# Patient Record
Sex: MALE | Race: WHITE | NOT HISPANIC OR LATINO
[De-identification: names, ages, dates, MRNs, and addresses within clinical notes are randomized per-mention and may not be internally consistent; named-entity substitution may affect disease eponyms.]

---

## 2018-06-01 PROBLEM — Z00.00 ENCOUNTER FOR PREVENTIVE HEALTH EXAMINATION: Status: ACTIVE | Noted: 2018-06-01

## 2018-06-25 ENCOUNTER — APPOINTMENT (OUTPATIENT)
Dept: PULMONOLOGY | Facility: CLINIC | Age: 73
End: 2018-06-25
Payer: MEDICARE

## 2018-06-25 VITALS
HEIGHT: 72 IN | SYSTOLIC BLOOD PRESSURE: 150 MMHG | BODY MASS INDEX: 30.88 KG/M2 | HEART RATE: 68 BPM | DIASTOLIC BLOOD PRESSURE: 89 MMHG | WEIGHT: 228 LBS | RESPIRATION RATE: 20 BRPM | OXYGEN SATURATION: 98 %

## 2018-06-25 DIAGNOSIS — G47.9 SLEEP DISORDER, UNSPECIFIED: ICD-10-CM

## 2018-06-25 DIAGNOSIS — Z82.0 FAMILY HISTORY OF EPILEPSY AND OTHER DISEASES OF THE NERVOUS SYSTEM: ICD-10-CM

## 2018-06-25 DIAGNOSIS — Z80.49 FAMILY HISTORY OF MALIGNANT NEOPLASM OF OTHER GENITAL ORGANS: ICD-10-CM

## 2018-06-25 DIAGNOSIS — Z86.39 PERSONAL HISTORY OF OTHER ENDOCRINE, NUTRITIONAL AND METABOLIC DISEASE: ICD-10-CM

## 2018-06-25 DIAGNOSIS — G47.30 SLEEP APNEA, UNSPECIFIED: ICD-10-CM

## 2018-06-25 PROCEDURE — 94727 GAS DIL/WSHOT DETER LNG VOL: CPT

## 2018-06-25 PROCEDURE — 94664 DEMO&/EVAL PT USE INHALER: CPT | Mod: 59

## 2018-06-25 PROCEDURE — 71046 X-RAY EXAM CHEST 2 VIEWS: CPT

## 2018-06-25 PROCEDURE — 94060 EVALUATION OF WHEEZING: CPT

## 2018-06-25 PROCEDURE — 94750: CPT

## 2018-06-25 PROCEDURE — 99205 OFFICE O/P NEW HI 60 MIN: CPT | Mod: 25

## 2018-06-25 PROCEDURE — 94729 DIFFUSING CAPACITY: CPT

## 2018-07-27 ENCOUNTER — APPOINTMENT (OUTPATIENT)
Dept: PULMONOLOGY | Facility: CLINIC | Age: 73
End: 2018-07-27
Payer: MEDICARE

## 2018-07-27 PROCEDURE — 95800 SLP STDY UNATTENDED: CPT

## 2018-07-28 PROCEDURE — 95800 SLP STDY UNATTENDED: CPT

## 2018-07-31 ENCOUNTER — FORM ENCOUNTER (OUTPATIENT)
Age: 73
End: 2018-07-31

## 2018-08-13 ENCOUNTER — APPOINTMENT (OUTPATIENT)
Dept: PULMONOLOGY | Facility: CLINIC | Age: 73
End: 2018-08-13
Payer: MEDICARE

## 2018-08-13 VITALS
DIASTOLIC BLOOD PRESSURE: 83 MMHG | OXYGEN SATURATION: 98 % | HEIGHT: 72 IN | BODY MASS INDEX: 30.48 KG/M2 | TEMPERATURE: 97.9 F | WEIGHT: 225 LBS | HEART RATE: 91 BPM | SYSTOLIC BLOOD PRESSURE: 134 MMHG

## 2018-08-13 PROCEDURE — 99213 OFFICE O/P EST LOW 20 MIN: CPT

## 2018-11-10 ENCOUNTER — TRANSCRIPTION ENCOUNTER (OUTPATIENT)
Age: 73
End: 2018-11-10

## 2018-12-21 ENCOUNTER — APPOINTMENT (OUTPATIENT)
Dept: CARDIOLOGY | Facility: CLINIC | Age: 73
End: 2018-12-21
Payer: MEDICARE

## 2018-12-21 ENCOUNTER — NON-APPOINTMENT (OUTPATIENT)
Age: 73
End: 2018-12-21

## 2018-12-21 VITALS
DIASTOLIC BLOOD PRESSURE: 80 MMHG | BODY MASS INDEX: 30.75 KG/M2 | SYSTOLIC BLOOD PRESSURE: 133 MMHG | HEART RATE: 68 BPM | WEIGHT: 227 LBS | HEIGHT: 72 IN | RESPIRATION RATE: 16 BRPM

## 2018-12-21 DIAGNOSIS — Z86.39 PERSONAL HISTORY OF OTHER ENDOCRINE, NUTRITIONAL AND METABOLIC DISEASE: ICD-10-CM

## 2018-12-21 DIAGNOSIS — Z78.9 OTHER SPECIFIED HEALTH STATUS: ICD-10-CM

## 2018-12-21 PROCEDURE — 93000 ELECTROCARDIOGRAM COMPLETE: CPT

## 2018-12-21 PROCEDURE — 99213 OFFICE O/P EST LOW 20 MIN: CPT

## 2019-03-14 ENCOUNTER — RX RENEWAL (OUTPATIENT)
Age: 74
End: 2019-03-14

## 2019-07-26 ENCOUNTER — APPOINTMENT (OUTPATIENT)
Dept: CARDIOLOGY | Facility: CLINIC | Age: 74
End: 2019-07-26
Payer: MEDICARE

## 2019-07-26 ENCOUNTER — NON-APPOINTMENT (OUTPATIENT)
Age: 74
End: 2019-07-26

## 2019-07-26 VITALS
RESPIRATION RATE: 15 BRPM | SYSTOLIC BLOOD PRESSURE: 127 MMHG | HEIGHT: 72 IN | WEIGHT: 227 LBS | HEART RATE: 68 BPM | DIASTOLIC BLOOD PRESSURE: 80 MMHG | BODY MASS INDEX: 30.75 KG/M2

## 2019-07-26 PROCEDURE — 99213 OFFICE O/P EST LOW 20 MIN: CPT

## 2019-07-26 PROCEDURE — 93000 ELECTROCARDIOGRAM COMPLETE: CPT

## 2019-07-26 NOTE — DISCUSSION/SUMMARY
[FreeTextEntry1] : This is a 74-year-old male with past medical history significant for murmur, hypertension, right bundle branch block, sleep apnea,NIDDM,  who comes in for cardiac evaluation. He denies chest pain, shortness of breath, dizziness or syncope. The patient had blood work done October 16, 2018 which demonstrated total cholesterol 136 mg/dL, HDL 29 mg/dL, LDL cholesterol 72 mg/dL, and hemoglobin A1c of 7.1%.\par Electrocardiogram done  July 26, 2019 demonstrated normal sinus rhythm rate 68 beats per minute is otherwise marked with right bundle branch block.\par Blood work done May 20, 2019 demonstrated a total cholesterol of 111 mg/dL, HDL 29 mg/dL, LDL was still pending.\par His LDL cholesterol should be less than 70 mg/dL.\par He will continue on his current dose of Lipitor 20 mg per day, Avalide 300/12.5 mg per day, in addition to his potassium 10 mEq only Saturday and Sunday and diabetic medications.\par He will schedule echo Doppler examination to evaluate his murmur, left ventricular function, chamber size, and rule out hypertrophy.\par \par The patient is currently hemodynamically stable and asymptomatic from a cardiac standpoint.\par I reviewed the results of his previous blood work, and echo Doppler examination with him today. He is instructed to follow up with you regarding his overall medical care. He will followup with me in 4 months.\par The patient understands that aerobic exercises must be increased to 40 minutes 4 times per week. A detailed discussion of lifestyle modification was done today. The patient has a good understanding of the diagnosis, and treatment plan. Lifestyle modification was also outlined.

## 2019-07-26 NOTE — PHYSICAL EXAM
[General Appearance - Well Developed] : well developed [Normal Appearance] : normal appearance [Well Groomed] : well groomed [General Appearance - Well Nourished] : well nourished [Normal Conjunctiva] : the conjunctiva exhibited no abnormalities [Normal Oral Mucosa] : normal oral mucosa [Normal Jugular Venous V Waves Present] : normal jugular venous V waves present [] : no respiratory distress [Respiration, Rhythm And Depth] : normal respiratory rhythm and effort [Exaggerated Use Of Accessory Muscles For Inspiration] : no accessory muscle use [Auscultation Breath Sounds / Voice Sounds] : lungs were clear to auscultation bilaterally [Abdomen Soft] : soft [Abnormal Walk] : normal gait [Gait - Sufficient For Exercise Testing] : the gait was sufficient for exercise testing [Skin Color & Pigmentation] : normal skin color and pigmentation [Nail Clubbing] : no clubbing of the fingernails [Skin Turgor] : normal skin turgor [Oriented To Time, Place, And Person] : oriented to person, place, and time [Impaired Insight] : insight and judgment were intact [Affect] : the affect was normal [Memory Recent] : recent memory was not impaired [FreeTextEntry1] : Left neck bruit v transmitted murmur

## 2019-07-26 NOTE — REASON FOR VISIT
[Follow-Up - Clinic] : a clinic follow-up of [FreeTextEntry1] : 72 y/o M with PMHx siginificant of hypertension, DM presents today for a follow-up cardiac evaluation. Patient denies shortness of breath, chest pain, dizziness, palpitations, syncope.

## 2019-08-21 ENCOUNTER — APPOINTMENT (OUTPATIENT)
Dept: PULMONOLOGY | Facility: CLINIC | Age: 74
End: 2019-08-21
Payer: MEDICARE

## 2019-08-21 VITALS
SYSTOLIC BLOOD PRESSURE: 130 MMHG | BODY MASS INDEX: 30.48 KG/M2 | OXYGEN SATURATION: 98 % | RESPIRATION RATE: 14 BRPM | WEIGHT: 225 LBS | DIASTOLIC BLOOD PRESSURE: 83 MMHG | HEART RATE: 68 BPM | HEIGHT: 72 IN

## 2019-08-21 LAB — POCT - HEMOGLOBIN (HGB), QUANTITATIVE, TRANSCUTANEOUS: 15.7

## 2019-08-21 PROCEDURE — 88738 HGB QUANT TRANSCUTANEOUS: CPT

## 2019-08-21 PROCEDURE — 94060 EVALUATION OF WHEEZING: CPT

## 2019-08-21 PROCEDURE — 94664 DEMO&/EVAL PT USE INHALER: CPT | Mod: 59

## 2019-08-21 PROCEDURE — 94726 PLETHYSMOGRAPHY LUNG VOLUMES: CPT

## 2019-08-21 PROCEDURE — 94750: CPT

## 2019-08-21 PROCEDURE — 94729 DIFFUSING CAPACITY: CPT

## 2019-08-21 PROCEDURE — 99213 OFFICE O/P EST LOW 20 MIN: CPT | Mod: 25

## 2019-08-21 NOTE — PHYSICAL EXAM
[Normal Appearance] : normal appearance [General Appearance - Well Developed] : well developed [No Deformities] : no deformities [General Appearance - Well Nourished] : well nourished [Well Groomed] : well groomed [General Appearance - In No Acute Distress] : no acute distress [Normal Conjunctiva] : the conjunctiva exhibited no abnormalities [Eyelids - No Xanthelasma] : the eyelids demonstrated no xanthelasmas [Normal Oropharynx] : normal oropharynx [Neck Appearance] : the appearance of the neck was normal [Neck Cervical Mass (___cm)] : no neck mass was observed [Thyroid Diffuse Enlargement] : the thyroid was not enlarged [Thyroid Nodule] : there were no palpable thyroid nodules [Jugular Venous Distention Increased] : there was no jugular-venous distention [Heart Rate And Rhythm] : heart rate and rhythm were normal [Heart Sounds] : normal S1 and S2 [Murmurs] : no murmurs present [Respiration, Rhythm And Depth] : normal respiratory rhythm and effort [Auscultation Breath Sounds / Voice Sounds] : lungs were clear to auscultation bilaterally [Exaggerated Use Of Accessory Muscles For Inspiration] : no accessory muscle use [Abdomen Soft] : soft [Bowel Sounds] : normal bowel sounds [Abdomen Tenderness] : non-tender [] : no hepato-splenomegaly [Abdomen Mass (___ Cm)] : no abdominal mass palpated [Abnormal Walk] : normal gait [Gait - Sufficient For Exercise Testing] : the gait was sufficient for exercise testing [Deep Tendon Reflexes (DTR)] : deep tendon reflexes were 2+ and symmetric [Sensation] : the sensory exam was normal to light touch and pinprick [No Focal Deficits] : no focal deficits [Oriented To Time, Place, And Person] : oriented to person, place, and time [Impaired Insight] : insight and judgment were intact [Affect] : the affect was normal [FreeTextEntry1] : vascular  varicose veins

## 2019-08-21 NOTE — PROCEDURE
[FreeTextEntry1] : PFT Body Box 8/21/2019\par Spirometry normal flow rates\par Lung volumes normal\par Resistance and sp conductance normal\par  DLCO 84 % normal range\par saw tooth pattern\par

## 2019-08-21 NOTE — DISCUSSION/SUMMARY
[FreeTextEntry1] : Mild obstructive sleep apnea\par In the past patient had declined treatment with CPAP oral recommendation regarding oral appliance\par PFT normal pulmonary physiology\par Recommended 1 year follow-up sleep study to determine whether there is any worsening of his AHI\par Patient felt very uncomfortable during the prior study and at present has declined and he states he thinks his sleep pattern snoring is improved\par Advised weight loss avoiding heavy meals alcohol sedatives before bedtime\par 1 year follow-up recommended

## 2019-08-21 NOTE — HISTORY OF PRESENT ILLNESS
[Improved] : have improved [None] : The patient is currently asymptomatic [Obstructive Sleep Apnea] : obstructive sleep apnea [AHI: ___ per hour] : Apnea-hypopnea index:  [unfilled] per hour [Date: ___] : Date of most recent diagnostic polysomnogram: [unfilled] [FreeTextEntry1] : Feels his sleep pattern is improved with less snoring\par No respiratory symptoms noted\par Denies wheeze cough purulent sputum.\par Weight stable appetite normal.\par Up-to-date with cardiology as noted\par No active tobacco use.\par

## 2019-09-16 ENCOUNTER — RX RENEWAL (OUTPATIENT)
Age: 74
End: 2019-09-16

## 2020-01-24 ENCOUNTER — APPOINTMENT (OUTPATIENT)
Dept: CARDIOLOGY | Facility: CLINIC | Age: 75
End: 2020-01-24
Payer: MEDICARE

## 2020-01-24 PROCEDURE — 93880 EXTRACRANIAL BILAT STUDY: CPT

## 2020-01-24 PROCEDURE — 93306 TTE W/DOPPLER COMPLETE: CPT

## 2020-02-24 ENCOUNTER — RX RENEWAL (OUTPATIENT)
Age: 75
End: 2020-02-24

## 2020-03-09 ENCOUNTER — RX RENEWAL (OUTPATIENT)
Age: 75
End: 2020-03-09

## 2020-08-19 ENCOUNTER — APPOINTMENT (OUTPATIENT)
Dept: CARDIOLOGY | Facility: CLINIC | Age: 75
End: 2020-08-19
Payer: MEDICARE

## 2020-08-19 ENCOUNTER — NON-APPOINTMENT (OUTPATIENT)
Age: 75
End: 2020-08-19

## 2020-08-19 VITALS
RESPIRATION RATE: 15 BRPM | HEIGHT: 72 IN | WEIGHT: 222 LBS | HEART RATE: 68 BPM | SYSTOLIC BLOOD PRESSURE: 134 MMHG | DIASTOLIC BLOOD PRESSURE: 82 MMHG | BODY MASS INDEX: 30.07 KG/M2

## 2020-08-19 PROCEDURE — 93000 ELECTROCARDIOGRAM COMPLETE: CPT

## 2020-08-19 PROCEDURE — 99214 OFFICE O/P EST MOD 30 MIN: CPT

## 2020-11-18 ENCOUNTER — APPOINTMENT (OUTPATIENT)
Dept: NEUROLOGY | Facility: CLINIC | Age: 75
End: 2020-11-18
Payer: MEDICARE

## 2020-11-18 VITALS
DIASTOLIC BLOOD PRESSURE: 89 MMHG | WEIGHT: 227 LBS | SYSTOLIC BLOOD PRESSURE: 168 MMHG | HEART RATE: 71 BPM | HEIGHT: 72 IN | BODY MASS INDEX: 30.75 KG/M2

## 2020-11-18 VITALS — TEMPERATURE: 97.2 F

## 2020-11-18 PROCEDURE — 99203 OFFICE O/P NEW LOW 30 MIN: CPT

## 2020-11-18 RX ORDER — ASPIRIN 81 MG
81 TABLET, DELAYED RELEASE (ENTERIC COATED) ORAL DAILY
Refills: 0 | Status: ACTIVE | COMMUNITY

## 2020-11-18 NOTE — ASSESSMENT
[FreeTextEntry1] : Mr. Means is a 75-year-old who suffers from hypertension, diabetes and hyperlipidemia.  He presents with the recent onset of postural and action tremors in both arms.  He has no other neurologic symptoms or findings.  In the absence of hyperthyroidism, I suspect this represents an essential tremor.  There is no evidence of other motor symptoms to suggest Parkinson's disease.\par \par He declined MR imaging of the brain.  He will retrieve his blood tests for my review.  I suggested that he return to the office in 6 months for routine follow-up.  If symptoms progress, he will contact me for reevaluation.\par \par Thank you for the courtesy of this consultation.

## 2020-11-18 NOTE — HISTORY OF PRESENT ILLNESS
[FreeTextEntry1] : Mr. Ronald Means is a 75-year-old left-handed gentleman who was referred for neurologic evaluation at your kind suggestion.\par \par Mr. Means suffers from hypertension, hyperlipidemia and diabetes.  He is very diligent about his health care.  He is under the care of Dr. Esa Contreras, a cardiologist.  He is scheduled for an echocardiogram and stress test soon.  A carotid Doppler performed last year revealed no significant stenosis.\par \par Several months ago, he noticed the onset of a mild tremor in his hands.  This is prominent in the morning and affects him when he eats.  He denies headaches, visual, cognitive, swallowing, sensory or gait difficulties.  There is no family history of tremor including his identical twin brother.  His mother  at an early age due to ovarian cancer.  Thyroid functions were checked and apparently were normal.

## 2020-11-18 NOTE — CONSULT LETTER
[Dear  ___] : Dear  [unfilled], [Consult Letter:] : I had the pleasure of evaluating your patient, [unfilled]. [Please see my note below.] : Please see my note below. [Consult Closing:] : Thank you very much for allowing me to participate in the care of this patient.  If you have any questions, please do not hesitate to contact me. [Sincerely,] : Sincerely, [FreeTextEntry3] : Colin Smiley MD\par  [DrMarianela  ___] : Dr. VILLALOBOS

## 2020-11-18 NOTE — PHYSICAL EXAM
[FreeTextEntry1] : Constitutional:  Patient was well-developed, well-nourished and in no acute distress. \par \par Head:  Normocephalic, atraumatic. Tympanic membranes were clear. \par \par Neck:  Supple with full range of motion. \par \par Cardiovascular:  Cardiac rhythm was regular without murmur. There were no carotid bruits. Peripheral pulses were full and symmetric. \par \par Respiratory:  Lungs were clear. \par \par Abdomen:  Soft and nontender. \par \par Spine:  Nontender. \par \par Skin:  There were no rashes. \par \par NEUROLOGICAL EXAMINATION:\par \par Mental Status: She was alert and oriented. Speech was fluent. There was no dysarthria or vocal tremor. \par \par Cranial Nerves: \par \par II: Visual acuity was 20-2 in the right eye and 20-1 in the left eye with glasses. Pupils were equal and reactive. Visual fields were full. Funduscopic examination was normal. \par \par III, IV, VI:  Eye movements were full without nystagmus. \par \par V: Facial sensation was intact. \par \par VII: Facial strength was normal. \par \par VIII: Hearing was equal. \par \par IX, X: Palatal movement was normal. Phonation was normal. \par \par XI: Sternocleidomastoids and trapezii were normal. \par \par XII: Tongue was midline and movements normal. There was no lingual atrophy or fasciculations. \par \par Motor Examination: Muscle bulk, tone and strength were normal.  There was a mild postural and endpoint tremor in both hands.  There was no rest tremor.\par \par Sensory Examination: Pinprick, vibration and joint position sense were intact. \par \par Reflexes: DTRs were 2 throughout except for the brachioradialis and ankles which were absent. \par \par Plantar Responses: Plantar responses were flexor. \par \par Coordination/Cerebellar Function: There was no dysmetria on finger to nose or heel to shin testing. \par \par Gait/Stance: Gait was normal. Romberg was negative.  Tandem was unsteady.\par

## 2021-01-11 ENCOUNTER — APPOINTMENT (OUTPATIENT)
Dept: CARDIOLOGY | Facility: CLINIC | Age: 76
End: 2021-01-11
Payer: MEDICARE

## 2021-01-11 VITALS
BODY MASS INDEX: 29.53 KG/M2 | DIASTOLIC BLOOD PRESSURE: 83 MMHG | HEART RATE: 76 BPM | HEIGHT: 72 IN | WEIGHT: 218 LBS | TEMPERATURE: 97.9 F | RESPIRATION RATE: 16 BRPM | SYSTOLIC BLOOD PRESSURE: 124 MMHG

## 2021-01-11 DIAGNOSIS — Z87.891 PERSONAL HISTORY OF NICOTINE DEPENDENCE: ICD-10-CM

## 2021-01-11 PROCEDURE — 99213 OFFICE O/P EST LOW 20 MIN: CPT

## 2021-05-13 ENCOUNTER — TRANSCRIPTION ENCOUNTER (OUTPATIENT)
Age: 76
End: 2021-05-13

## 2021-05-18 ENCOUNTER — APPOINTMENT (OUTPATIENT)
Dept: NEUROLOGY | Facility: CLINIC | Age: 76
End: 2021-05-18
Payer: MEDICARE

## 2021-05-18 VITALS
DIASTOLIC BLOOD PRESSURE: 62 MMHG | WEIGHT: 210 LBS | SYSTOLIC BLOOD PRESSURE: 95 MMHG | HEIGHT: 72 IN | HEART RATE: 87 BPM | BODY MASS INDEX: 28.44 KG/M2

## 2021-05-18 DIAGNOSIS — G25.0 ESSENTIAL TREMOR: ICD-10-CM

## 2021-05-18 PROCEDURE — 99213 OFFICE O/P EST LOW 20 MIN: CPT

## 2021-05-18 NOTE — PHYSICAL EXAM
[FreeTextEntry1] : Constitutional:  Patient was well-developed, well-nourished and in no acute distress. \par \par Head:  Normocephalic, atraumatic. Tympanic membranes were clear. \par \par Neck:  Supple with full range of motion. \par \par Cardiovascular:  Cardiac rhythm was regular without murmur. There were no carotid bruits. Peripheral pulses were full and symmetric. \par \par Respiratory:  Lungs were clear. \par \par Abdomen:  Soft and nontender. \par \par Spine:  Nontender. \par \par Skin:  There were no rashes. \par \par NEUROLOGICAL EXAMINATION:\par \par Mental Status: She was alert and oriented. Speech was fluent. There was no dysarthria or vocal tremor. \par \par Cranial Nerves: \par \par II: He could finger count bilaterally. Pupils were equal and reactive. Visual fields were full. Funduscopic examination was normal. \par \par III, IV, VI:  Eye movements were full without nystagmus. \par \par V: Facial sensation was intact. \par \par VII: Facial strength was normal. \par \par VIII: Hearing was equal. \par \par IX, X: Palatal movement was normal. Phonation was normal. \par \par XI: Sternocleidomastoids and trapezii were normal. \par \par XII: Tongue was midline and movements normal. There was no lingual atrophy or fasciculations. \par \par Motor Examination: Muscle bulk, tone and strength were normal.  There were mild postural and endpoint tremors in both hands.  There was no rest tremor or rigidity.\par \par Sensory Examination: Pinprick, vibration and joint position sense were intact. \par \par Reflexes: DTRs were 2 throughout except for the brachioradialis and ankles which were absent. \par \par Plantar Responses: Plantar responses were flexor. \par \par Coordination/Cerebellar Function: There was no dysmetria on finger to nose or heel to shin testing. \par \par Gait/Stance: Gait was normal. Romberg was negative.  Tandem was unsteady.\par

## 2021-05-18 NOTE — HISTORY OF PRESENT ILLNESS
[FreeTextEntry1] : Mr. Ronald Means returned to the office having been initially evaluated on November 18, 2020.  He is a 76-year-old left-handed gentleman who suffers from hypertension, diabetes and hyperlipidemia.  He presented with recent onset of postural tremor in his arms.  There were no other neurologic symptoms or findings.  TSH was 1.59 on October 13, 2020.  I suspected that this represented an essential tremor.  There was no evidence of other motor symptoms to suggest Parkinson's disease.  He declined imaging of the brain.  He consented to return in 6 months for routine follow-up.\par \par Mr. Means is recovering from a recent diarrheal illness.  Other family members were involved.  He received his last Moderna COVID-19 vaccine in February.\par \par He has mild hand tremors which are not functionally limiting.  He denies headaches, neck or back pain, cognitive, speech, visual, hearing, swallowing, sensory or gait difficulties.

## 2021-05-18 NOTE — ASSESSMENT
[FreeTextEntry1] : Mr. Means is a 76-year-old who suffers from hypertension, diabetes and hyperlipidemia.  He suffers from an essential tremor.  There has been no progression of symptoms since last seen.  He declines imaging.  He will consent to a follow-up visit in 6 months.  If his symptoms progress, he will call me for prompt reevaluation.

## 2021-11-05 ENCOUNTER — LABORATORY RESULT (OUTPATIENT)
Age: 76
End: 2021-11-05

## 2021-11-05 ENCOUNTER — NON-APPOINTMENT (OUTPATIENT)
Age: 76
End: 2021-11-05

## 2021-11-05 ENCOUNTER — APPOINTMENT (OUTPATIENT)
Dept: CARDIOLOGY | Facility: CLINIC | Age: 76
End: 2021-11-05
Payer: MEDICARE

## 2021-11-05 VITALS
DIASTOLIC BLOOD PRESSURE: 70 MMHG | RESPIRATION RATE: 15 BRPM | OXYGEN SATURATION: 98 % | TEMPERATURE: 97.7 F | SYSTOLIC BLOOD PRESSURE: 126 MMHG | WEIGHT: 202 LBS | HEIGHT: 72 IN | HEART RATE: 68 BPM | BODY MASS INDEX: 27.36 KG/M2

## 2021-11-05 VITALS
HEIGHT: 72 IN | TEMPERATURE: 98 F | HEART RATE: 64 BPM | OXYGEN SATURATION: 98 % | WEIGHT: 202 LBS | BODY MASS INDEX: 27.36 KG/M2 | RESPIRATION RATE: 16 BRPM

## 2021-11-05 DIAGNOSIS — I73.9 PERIPHERAL VASCULAR DISEASE, UNSPECIFIED: ICD-10-CM

## 2021-11-05 DIAGNOSIS — G47.33 OBSTRUCTIVE SLEEP APNEA (ADULT) (PEDIATRIC): ICD-10-CM

## 2021-11-05 PROCEDURE — 99214 OFFICE O/P EST MOD 30 MIN: CPT

## 2021-11-05 PROCEDURE — 93000 ELECTROCARDIOGRAM COMPLETE: CPT

## 2021-11-05 PROCEDURE — 93922 UPR/L XTREMITY ART 2 LEVELS: CPT

## 2021-11-05 NOTE — REASON FOR VISIT
[Follow-Up - Clinic] : a clinic follow-up of [Mitral Regurgitation] : mitral regurgitation [CV Risk Factors and Non-Cardiac Disease] : CV risk factors and non-cardiac disease [Arrhythmia/ECG Abnorrmalities] : arrhythmia/ECG abnormalities [Hyperlipidemia] : hyperlipidemia [Hypertension] : hypertension [FreeTextEntry1] : NIDDM, sleep apnea

## 2021-11-05 NOTE — DISCUSSION/SUMMARY
[FreeTextEntry1] : This is a 76-year-old male with past medical history significant for murmur, hypertension, right bundle branch block, sleep apnea,NIDDM,  who comes in for cardiac evaluation. He denies chest pain, shortness of breath, dizziness or syncope. \par The patient has lost 20 pounds over the last 6 to 8 months.  He is walking on a regular basis.\par Ankle-brachial index done November 5, 2021 was nondiagnostic on the right secondary to noncompressible on the right with normal arterial flow on the left.\par He will have blood work done today for electrolytes and lipid panel.\par He is currently hemodynamically stable and asymptomatic from a cardiac standpoint.\par He will follow-up with his primary care physician as well as his endocrinologist.\par Electrocardiogram done November 5, 2021 demonstrate normal sinus rhythm rate 68 bpm is otherwise remarkable for right bundle branch block.\par \par The patient remained stable from a cardiac standpoint.  He did have blood work done October 13 with his endocrinologist.  His hemoglobin A1c had improved to 6.3.\par His lipid panel demonstrated an LDL calculated of 67 mg/dL, triglycerides 144 mg/dL, cholesterol 124 mg/dL, HDL 28 mg/dL.\par Echo Doppler examination from January 24, 2020, which demonstrated minimal to mild mitral valve regurgitation, minimal to mild tricuspid valve regurgitation, enlarged proximal aortic root of 4.3 cm, and aortic valve sclerosis.\par \par Electrocardiogram done August 19, 2020 demonstrated normal sinus rhythm rate 68 bpm is otherwise remarkable for right bundle branch block.\par \par The patient had blood work done by his primary care physician May 19, 2020 which demonstrated hemoglobin A1c of 6.2, cholesterol 120, triglycerides 151, HDL of 27, calculated LDL cholesterol 63 mg/dL.\par He will continue on his current dose of Lipitor 20 mg/day, Avalide 300/12.5 mg/day, potassium 10 mEq only on Saturday and Sunday, aspirin 81 mg/day in addition to his diabetic medications.\par The patient is currently hemodynamically stable and asymptomatic from cardiac standpoint.  He will continue on his regular diet and exercise program.\par \par He had a normal thallium stress test February 29, 2016 with normal left ventricular function and estimated ejection fraction 65%.\par \par The patient understands that aerobic exercises must be increased to 40 minutes 4 times per week. A detailed discussion of lifestyle modification was done today. The patient has a good understanding of the diagnosis, and treatment plan. Lifestyle modification was also outlined.

## 2021-11-05 NOTE — PHYSICAL EXAM
[General Appearance - Well Developed] : well developed [Normal Appearance] : normal appearance [Well Groomed] : well groomed [General Appearance - Well Nourished] : well nourished [Normal Oral Mucosa] : normal oral mucosa [Normal Jugular Venous V Waves Present] : normal jugular venous V waves present [] : no respiratory distress [Respiration, Rhythm And Depth] : normal respiratory rhythm and effort [Exaggerated Use Of Accessory Muscles For Inspiration] : no accessory muscle use [Auscultation Breath Sounds / Voice Sounds] : lungs were clear to auscultation bilaterally [Abdomen Soft] : soft [Abnormal Walk] : normal gait [Gait - Sufficient For Exercise Testing] : the gait was sufficient for exercise testing [Nail Clubbing] : no clubbing of the fingernails [Skin Color & Pigmentation] : normal skin color and pigmentation [Skin Turgor] : normal skin turgor [Oriented To Time, Place, And Person] : oriented to person, place, and time [Impaired Insight] : insight and judgment were intact [Affect] : the affect was normal [Memory Recent] : recent memory was not impaired [No Precordial Heave] : no precordial heave was noted [No Abnormalities] : the abdominal aorta was not enlarged and no bruit was heard [No Pitting Edema] : no pitting edema present [Well Developed] : well developed [Well Nourished] : well nourished [No Acute Distress] : no acute distress [Normal Conjunctiva] : normal conjunctiva [Normal Venous Pressure] : normal venous pressure [No Carotid Bruit] : no carotid bruit [Normal S1, S2] : normal S1, S2 [No Rub] : no rub [No Gallop] : no gallop [5th Left ICS - MCL] : palpated at the 5th LICS in the midclavicular line [Normal] : normal [Normal Rate] : normal [Rhythm Regular] : regular [Normal S1] : normal S1 [Normal S2] : normal S2 [I] : a grade 1 [2+] : left 2+ [Clear Lung Fields] : clear lung fields [Good Air Entry] : good air entry [No Respiratory Distress] : no respiratory distress  [Soft] : abdomen soft [Non Tender] : non-tender [No Masses/organomegaly] : no masses/organomegaly [Normal Bowel Sounds] : normal bowel sounds [Normal Gait] : normal gait [No Edema] : no edema [No Cyanosis] : no cyanosis [No Clubbing] : no clubbing [No Varicosities] : no varicosities [No Rash] : no rash [No Skin Lesions] : no skin lesions [Moves all extremities] : moves all extremities [No Focal Deficits] : no focal deficits [Normal Speech] : normal speech [Alert and Oriented] : alert and oriented [Normal memory] : normal memory [FreeTextEntry1] : Left neck bruit v transmitted murmur [S3] : no S3 [S4] : no S4 [Right Carotid Bruit] : no bruit heard over the right carotid [Left Carotid Bruit] : no bruit heard over the left carotid [Right Femoral Bruit] : no bruit heard over the right femoral artery [Left Femoral Bruit] : no bruit heard over the left femoral artery

## 2021-11-08 PROBLEM — I73.9 CLAUDICATION: Status: ACTIVE | Noted: 2021-11-05

## 2021-12-09 ENCOUNTER — TRANSCRIPTION ENCOUNTER (OUTPATIENT)
Age: 76
End: 2021-12-09

## 2022-01-03 ENCOUNTER — EMERGENCY (EMERGENCY)
Facility: HOSPITAL | Age: 77
LOS: 1 days | Discharge: HOME | End: 2022-01-03
Attending: EMERGENCY MEDICINE
Payer: MEDICARE

## 2022-01-03 VITALS
TEMPERATURE: 98 F | HEART RATE: 71 BPM | RESPIRATION RATE: 20 BRPM | DIASTOLIC BLOOD PRESSURE: 92 MMHG | WEIGHT: 205.03 LBS | SYSTOLIC BLOOD PRESSURE: 155 MMHG | OXYGEN SATURATION: 99 %

## 2022-01-03 VITALS
HEART RATE: 80 BPM | DIASTOLIC BLOOD PRESSURE: 89 MMHG | OXYGEN SATURATION: 98 % | RESPIRATION RATE: 20 BRPM | SYSTOLIC BLOOD PRESSURE: 170 MMHG | TEMPERATURE: 97 F

## 2022-01-03 LAB
APPEARANCE UR: CLEAR — SIGNIFICANT CHANGE UP
BACTERIA # UR AUTO: 0 — SIGNIFICANT CHANGE UP
BILIRUB UR-MCNC: NEGATIVE — SIGNIFICANT CHANGE UP
COLOR SPEC: SIGNIFICANT CHANGE UP
DIFF PNL FLD: NEGATIVE — SIGNIFICANT CHANGE UP
EPI CELLS # UR: 0 /HPF — SIGNIFICANT CHANGE UP
GLUCOSE UR QL: NEGATIVE — SIGNIFICANT CHANGE UP
KETONES UR-MCNC: NEGATIVE — SIGNIFICANT CHANGE UP
LEUKOCYTE ESTERASE UR-ACNC: NEGATIVE — SIGNIFICANT CHANGE UP
NITRITE UR-MCNC: NEGATIVE — SIGNIFICANT CHANGE UP
PH UR: 5.5 — SIGNIFICANT CHANGE UP (ref 5–8)
PROT UR-MCNC: NEGATIVE — SIGNIFICANT CHANGE UP
RBC CASTS # UR COMP ASSIST: 1 /HPF — SIGNIFICANT CHANGE UP (ref 0–4)
SP GR SPEC: 1.01 — SIGNIFICANT CHANGE UP (ref 1.01–1.02)
UROBILINOGEN FLD QL: NEGATIVE — SIGNIFICANT CHANGE UP
WBC UR QL: 1 /HPF — SIGNIFICANT CHANGE UP (ref 0–5)

## 2022-01-03 PROCEDURE — 81001 URINALYSIS AUTO W/SCOPE: CPT

## 2022-01-03 PROCEDURE — 87086 URINE CULTURE/COLONY COUNT: CPT

## 2022-01-03 PROCEDURE — 99283 EMERGENCY DEPT VISIT LOW MDM: CPT | Mod: GC

## 2022-01-03 PROCEDURE — 99283 EMERGENCY DEPT VISIT LOW MDM: CPT

## 2022-01-03 NOTE — ED PROVIDER NOTE - ATTENDING CONTRIBUTION TO CARE
76yr M hx of DM HTN HL w fam hx of hernias p/w abd and rt groin fullness and pain while jogging, and a sensation of groin mass. since presenting to the ED, mass has disappeared and pain resolved. reports no change to bowel habits, no urinary sx, no hematuria or blood in stool, no n/v, no fever chills, and no cp sob.  exam notable for well appearing, sitting comfortably, neuro intact, clear lungs, S1-2, soft non tender abd, rt inguinal hernia w 4cm mouth, reduced. well perfused  diff include recurrent inguinal hernia vs urinary infec vs less likely nephrolith. planf or UA. educated the patient regarding return precautions, plans for surgery outpt for eventual repair. is happy with plan

## 2022-01-03 NOTE — ED PROVIDER NOTE - PATIENT PORTAL LINK FT
You can access the FollowMyHealth Patient Portal offered by [facility] by registering at the following website: [email or web address]. By joining [facility]’s FollowMyHealth portal, you will also be able to view your health information using other applications (apps) compatible with our system.

## 2022-01-03 NOTE — ED PROVIDER NOTE - NSFOLLOWUPCLINICS_GEN_ALL_ED_FT
[facility]  General Surgery  [address] [place] [address] - [address] [place] [address]  Phone: [phone]  Fax:

## 2022-01-03 NOTE — ED PROVIDER NOTE - OBJECTIVE STATEMENT
76M with pmhx of DM, HTN, HLD presenting we CC of R groin pain. Patient first noticed the pain on thursday while straining during a covid test. Then noticed it again during the day today, just prior to presentation after a 2 mile walk. Noticed a bulge in that area. Pain and bulge was not resolving on its own today leading to ER presentation. Denies n/v, diarrhea, urinary symptoms. No history of inguinal hernias in self. No prior abdominal surgeries.

## 2022-01-03 NOTE — ED PROVIDER NOTE - NS ED ROS FT
CONST: no fevers, no chills, no trauma  EYES: no pain, no blurry vision   ENT: no sore throat, no epistaxis, no rhinorrhea  CV: no chest pain, no palpitations, no orthopnea, no extremity pain or swelling  RESP: no shortness of breath, no cough, no sputum, no pleurisy, no wheezing  ABD: +abdominal pain, no nausea, no vomiting, no diarrhea, no black or bloody stool  : no dysuria, no hematuria, no frequency, no urgency  MSK: no back pain, no neck pain, no extremity pain  NEURO: no headache, no sensory disturbances, no focal weakness, no dizziness  HEME: no easy bleeding or bruising  SKIN: no diaphoresis, no rash

## 2022-01-03 NOTE — ED PROVIDER NOTE - NSFOLLOWUPINSTRUCTIONS_ED_ALL_ED_FT
Please see attached information on hernias.     Return to the ER for new or worsening pain, fevers, vomiting, or any other concerning symptoms.     Follow up with our general surgery clinic and your PCP.     You can take 650mg of Tylenol for pain as needed up to 4 times a day.

## 2022-01-03 NOTE — ED ADULT NURSE NOTE - OBJECTIVE STATEMENT
Patient felt discomfort in groin when walking this morning. Patient denies pain and burning on urination. Patient believes this discomfort to be a inguinal hernia. PMHx includes diabetes. UA and UC collected and given to the lab. Awaiting results. patient is comfortable and states the pain has subsided. Patient felt discomfort in groin when walking this morning. Patient denies pain and burning on urination. Patient believes this discomfort to be a inguinal hernia. PMHx includes diabetes which is controlled by oral and subQ meds, also controlled by diet and exercise. UA and UC collected and given to the lab. Awaiting results. Patient is comfortable and states the pain has subsided. 75 y/o male presents to ED. Patient felt discomfort in groin when walking this morning. Patient denies pain and burning on urination. Patient believes this discomfort to be a inguinal hernia. PMHx includes diabetes which is controlled by oral and subQ meds, also controlled by diet and exercise. UA and UC collected and given to the lab. Awaiting results. Patient is comfortable and states the pain has subsided.

## 2022-01-03 NOTE — ED PROVIDER NOTE - CLINICAL SUMMARY MEDICAL DECISION MAKING FREE TEXT BOX
76M presenting w R groin pain. On exam VSS, +reducible R inguinal hernia. Given exam and patient w resolving pain, will likely be okay with outpatient surgery follow up. Will check urine, reassess. Herlinda Barcenas, ORESTES PGY3

## 2022-01-03 NOTE — ED ADULT NURSE NOTE - NS ED NURSE LEVEL OF CONSCIOUSNESS ORIENTATION
Price (Do Not Change): 0.00 Detail Level: Simple Instructions: This plan will send the code FBSD to the PM system.  DO NOT or CHANGE the price. Oriented - self; Oriented - place; Oriented - time

## 2022-01-03 NOTE — ED PROVIDER NOTE - PHYSICAL EXAMINATION
Const: Well-nourished, Well-developed, appearing stated age.  Eyes: no conjunctival injection, and symmetrical lids.  HEENT: Head NCAT, no lesions. Atraumatic external nose and ears.   Neck: Symmetric, trachea midline.   CVS: +S1/S2, Radial and DP pulses 2+ b/l.   RESP: Unlabored respiratory effort. Clear to auscultation bilaterally.  GI: +Inguinal bulge in R groin when coughing, Rest of abd is soft Nontender/Nondistended, No CVA tenderness b/l.   MSK: Normocephalic/Atraumatic, Lower Extremities w/o edema b/l.   Skin: Warm, dry and intact.   Neuro: Fluent Speech. Motor & Sensation grossly intact.  Psych: Awake, Alert, & Oriented (AAO) x3. Appropriate mood and affect.

## 2022-01-04 LAB
CULTURE RESULTS: SIGNIFICANT CHANGE UP
SPECIMEN SOURCE: SIGNIFICANT CHANGE UP

## 2022-01-06 ENCOUNTER — APPOINTMENT (OUTPATIENT)
Dept: SURGERY | Facility: CLINIC | Age: 77
End: 2022-01-06
Payer: MEDICARE

## 2022-01-06 VITALS
SYSTOLIC BLOOD PRESSURE: 144 MMHG | HEART RATE: 78 BPM | HEIGHT: 72 IN | DIASTOLIC BLOOD PRESSURE: 86 MMHG | BODY MASS INDEX: 27.77 KG/M2 | WEIGHT: 205 LBS | OXYGEN SATURATION: 99 %

## 2022-01-06 VITALS — TEMPERATURE: 96.9 F

## 2022-01-06 DIAGNOSIS — K40.90 UNILATERAL INGUINAL HERNIA, W/OUT OBSTRUCTION OR GANGRENE, NOT SPECIFIED AS RECURRENT: ICD-10-CM

## 2022-01-06 PROCEDURE — 99203 OFFICE O/P NEW LOW 30 MIN: CPT

## 2022-01-06 RX ORDER — MULTIVITAMIN
TABLET ORAL
Refills: 0 | Status: ACTIVE | COMMUNITY

## 2022-01-06 RX ORDER — SITAGLIPTIN AND METFORMIN HYDROCHLORIDE 50; 1000 MG/1; MG/1
50-1000 TABLET, FILM COATED ORAL TWICE DAILY
Refills: 0 | Status: ACTIVE | COMMUNITY

## 2022-01-06 RX ORDER — CHROMIUM 200 MCG
1000 TABLET ORAL
Refills: 0 | Status: ACTIVE | COMMUNITY

## 2022-01-06 NOTE — HISTORY OF PRESENT ILLNESS
[de-identified] : Mr. APURVA CURRIE is  a 76 year old male who was referred by Dr. Georgi Nugent with the chief complaint of having pain and swelling in his Right  groin.  He has had the condition for for few days  and is worse when he is walking or standing.  He is stating that on Saturday he had pain and felt a bulge in his right groin and he went to [facility] ED. he states that by the time he was examined the bulge went away.   He denies any fever or  night sweats. Appetite is good and weight is stable. \par  \par

## 2022-01-06 NOTE — CONSULT LETTER
[Dear  ___] : Dear  [unfilled], [Consult Letter:] : I had the pleasure of evaluating your patient, [unfilled]. [Please see my note below.] : Please see my note below. [Consult Closing:] : Thank you very much for allowing me to participate in the care of this patient.  If you have any questions, please do not hesitate to contact me. [Sincerely,] : Sincerely, [FreeTextEntry3] : Nehemias Brar MD, FACS

## 2022-01-06 NOTE — PHYSICAL EXAM
[Alert] : alert [Oriented to Person] : oriented to person [Oriented to Place] : oriented to place [Oriented to Time] : oriented to time [Calm] : calm [de-identified] : He  is alert, well-groomed, and in NAD\par   [de-identified] : anicteric.  Nasal mucosa pink, septum midline. Oral mucosa pink.  Tongue midline, Pharynx without exudates.\par   [de-identified] : Neck supple. Trachea midline. Thyroid isthmus barely palpable, lobes not felt.\par   [de-identified] :  reducible Right  inguinal hernia.  No evidence of hernia on the opposite side.  No penile discharge or lesions.  No scrotal swelling or discoloration. Testes descended bilaterally, smooth, without masses. Epididymis non-tender.

## 2022-01-06 NOTE — PLAN
[FreeTextEntry1] : Mr. CURRIE  was told significance of findings, options, risks and benefits were explained.  Informed consent for right inguinal hernia repair and potential risks, benefits and alternatives (surgical options were discussed including non-surgical options or the option of no surgery) to the planned surgery were discussed in depth.  All surgical options were discussed including non-surgical treatments.  He wishes to proceed with surgery.  We will plan for surgery on at the next available date, pending any required insurance pre-certification or pre-approval. He agrees to obtain any necessary pre-operative evaluations and testing prior to surgery.\par Patient advised to seek immediate medical attention with any acute change in symptoms or with the development of any new or worsening symptoms.  Patient's questions and concerns addressed to patient's satisfaction, and patient verbalized an understanding of the information discussed.\par \par

## 2022-01-12 ENCOUNTER — OUTPATIENT (OUTPATIENT)
Dept: OUTPATIENT SERVICES | Facility: HOSPITAL | Age: 77
LOS: 1 days | End: 2022-01-12

## 2022-01-12 VITALS
OXYGEN SATURATION: 98 % | HEIGHT: 70.5 IN | HEART RATE: 73 BPM | DIASTOLIC BLOOD PRESSURE: 81 MMHG | RESPIRATION RATE: 16 BRPM | WEIGHT: 199.96 LBS | TEMPERATURE: 97 F | SYSTOLIC BLOOD PRESSURE: 141 MMHG

## 2022-01-12 DIAGNOSIS — K40.90 UNILATERAL INGUINAL HERNIA, WITHOUT OBSTRUCTION OR GANGRENE, NOT SPECIFIED AS RECURRENT: ICD-10-CM

## 2022-01-12 DIAGNOSIS — K40.30 UNILATERAL INGUINAL HERNIA, WITH OBSTRUCTION, WITHOUT GANGRENE, NOT SPECIFIED AS RECURRENT: ICD-10-CM

## 2022-01-12 DIAGNOSIS — I10 ESSENTIAL (PRIMARY) HYPERTENSION: ICD-10-CM

## 2022-01-12 DIAGNOSIS — Z90.89 ACQUIRED ABSENCE OF OTHER ORGANS: Chronic | ICD-10-CM

## 2022-01-12 DIAGNOSIS — Z98.890 OTHER SPECIFIED POSTPROCEDURAL STATES: Chronic | ICD-10-CM

## 2022-01-12 DIAGNOSIS — E11.9 TYPE 2 DIABETES MELLITUS WITHOUT COMPLICATIONS: ICD-10-CM

## 2022-01-12 PROBLEM — E78.5 HYPERLIPIDEMIA, UNSPECIFIED: Chronic | Status: ACTIVE | Noted: 2022-01-03

## 2022-01-12 LAB
A1C WITH ESTIMATED AVERAGE GLUCOSE RESULT: 6.7 % — HIGH (ref 4–5.6)
ANION GAP SERPL CALC-SCNC: 16 MMOL/L — HIGH (ref 7–14)
BUN SERPL-MCNC: 26 MG/DL — HIGH (ref 7–23)
CALCIUM SERPL-MCNC: 9.7 MG/DL — SIGNIFICANT CHANGE UP (ref 8.4–10.5)
CHLORIDE SERPL-SCNC: 104 MMOL/L — SIGNIFICANT CHANGE UP (ref 98–107)
CO2 SERPL-SCNC: 21 MMOL/L — LOW (ref 22–31)
CREAT SERPL-MCNC: 0.96 MG/DL — SIGNIFICANT CHANGE UP (ref 0.5–1.3)
ESTIMATED AVERAGE GLUCOSE: 146 — SIGNIFICANT CHANGE UP
GLUCOSE SERPL-MCNC: 127 MG/DL — HIGH (ref 70–99)
HCT VFR BLD CALC: 46 % — SIGNIFICANT CHANGE UP (ref 39–50)
HGB BLD-MCNC: 14.6 G/DL — SIGNIFICANT CHANGE UP (ref 13–17)
MCHC RBC-ENTMCNC: 28.1 PG — SIGNIFICANT CHANGE UP (ref 27–34)
MCHC RBC-ENTMCNC: 31.7 GM/DL — LOW (ref 32–36)
MCV RBC AUTO: 88.5 FL — SIGNIFICANT CHANGE UP (ref 80–100)
NRBC # BLD: 0 /100 WBCS — SIGNIFICANT CHANGE UP
NRBC # FLD: 0 K/UL — SIGNIFICANT CHANGE UP
PLATELET # BLD AUTO: 223 K/UL — SIGNIFICANT CHANGE UP (ref 150–400)
POTASSIUM SERPL-MCNC: 4.1 MMOL/L — SIGNIFICANT CHANGE UP (ref 3.5–5.3)
POTASSIUM SERPL-SCNC: 4.1 MMOL/L — SIGNIFICANT CHANGE UP (ref 3.5–5.3)
RBC # BLD: 5.2 M/UL — SIGNIFICANT CHANGE UP (ref 4.2–5.8)
RBC # FLD: 13.6 % — SIGNIFICANT CHANGE UP (ref 10.3–14.5)
SODIUM SERPL-SCNC: 141 MMOL/L — SIGNIFICANT CHANGE UP (ref 135–145)
WBC # BLD: 9.3 K/UL — SIGNIFICANT CHANGE UP (ref 3.8–10.5)
WBC # FLD AUTO: 9.3 K/UL — SIGNIFICANT CHANGE UP (ref 3.8–10.5)

## 2022-01-12 RX ORDER — INSULIN DEGLUDEC 100 U/ML
20 INJECTION, SOLUTION SUBCUTANEOUS
Qty: 0 | Refills: 0 | DISCHARGE

## 2022-01-12 NOTE — H&P PST ADULT - HISTORY OF PRESENT ILLNESS
77 y/o male with Type 2 DM, HLD, HTN and RBBB presents to PST preop for right inguinal hernia repair. pt presented to the ER reporting a bulge and right groin pain. pt subsequently diagnosed with right inguinal hernia. he denies pain at present and says bulge has reduced.

## 2022-01-12 NOTE — H&P PST ADULT - NSICDXPASTMEDICALHX_GEN_ALL_CORE_FT
PAST MEDICAL HISTORY:  DM (diabetes mellitus) type 2 DM    HLD (hyperlipidemia)     HTN (hypertension)     Right bundle branch block     Unilateral inguinal hernia without obstruction or gangrene

## 2022-01-12 NOTE — H&P PST ADULT - PROBLEM SELECTOR PLAN 2
pt will hold Janumet AM of surgery   he will reduce Tresiba by 20% night before surgery. pt will take 16 units    accu check to be assessed on admission

## 2022-01-12 NOTE — H&P PST ADULT - PROBLEM SELECTOR PLAN 1
preop for right inguinal hernia repair on 1/20/22  preop instructions given, pt verbalized understanding  GI prophylaxis and chlorhexidine wash provided  pt is scheduled for COVID testing preop  medical clearance requested- pt with multiple comorbidities and advanced age

## 2022-01-19 ENCOUNTER — TRANSCRIPTION ENCOUNTER (OUTPATIENT)
Age: 77
End: 2022-01-19

## 2022-01-19 VITALS
OXYGEN SATURATION: 98 % | HEART RATE: 82 BPM | SYSTOLIC BLOOD PRESSURE: 166 MMHG | WEIGHT: 199.96 LBS | TEMPERATURE: 97 F | HEIGHT: 70.5 IN | DIASTOLIC BLOOD PRESSURE: 76 MMHG | RESPIRATION RATE: 15 BRPM

## 2022-01-19 NOTE — ASU PREOPERATIVE ASSESSMENT, ADULT (IPARK ONLY) - FALL HARM RISK - UNIVERSAL INTERVENTIONS
Bed in lowest position, wheels locked, appropriate side rails in place/Call bell, personal items and telephone in reach/Instruct patient to call for assistance before getting out of bed or chair/Non-slip footwear when patient is out of bed/[place] to call system/Physically safe environment - no spills, clutter or unnecessary equipment/Purposeful Proactive Rounding/Room/bathroom lighting operational, light cord in reach

## 2022-01-19 NOTE — ASU PREOPERATIVE ASSESSMENT, ADULT (IPARK ONLY) - ACCUCHECK
136 Dorsal Nasal Flap Text: The defect edges were debeveled with a #15 scalpel blade.  Given the location of the defect and the proximity to free margins a dorsal nasal flap was deemed most appropriate.  Using a sterile surgical marker, an appropriate dorsal nasal flap was drawn around the defect.    The area thus outlined was incised deep to adipose tissue with a #15 scalpel blade.  The skin margins were undermined to an appropriate distance in all directions utilizing iris scissors.

## 2022-01-20 ENCOUNTER — OUTPATIENT (OUTPATIENT)
Dept: OUTPATIENT SERVICES | Facility: HOSPITAL | Age: 77
LOS: 1 days | Discharge: HOME | End: 2022-01-20

## 2022-01-20 VITALS
DIASTOLIC BLOOD PRESSURE: 90 MMHG | RESPIRATION RATE: 16 BRPM | SYSTOLIC BLOOD PRESSURE: 125 MMHG | TEMPERATURE: 97 F | OXYGEN SATURATION: 99 % | HEART RATE: 72 BPM

## 2022-01-20 DIAGNOSIS — K40.30 UNILATERAL INGUINAL HERNIA, WITH OBSTRUCTION, WITHOUT GANGRENE, NOT SPECIFIED AS RECURRENT: ICD-10-CM

## 2022-01-20 DIAGNOSIS — Z98.890 OTHER SPECIFIED POSTPROCEDURAL STATES: Chronic | ICD-10-CM

## 2022-01-20 DIAGNOSIS — Z90.89 ACQUIRED ABSENCE OF OTHER ORGANS: Chronic | ICD-10-CM

## 2022-01-20 LAB — GLUCOSE BLDC GLUCOMTR-MCNC: 136 MG/DL — HIGH (ref 70–99)

## 2022-01-20 DEVICE — MESH HERNIA PERFIX PLUG EXTRA LARGE 1.6 X 2": Type: IMPLANTABLE DEVICE | Site: RIGHT | Status: FUNCTIONAL

## 2022-01-20 DEVICE — MESH HERNIA PERFIX PLUG SMALL 1 X 1.35": Type: IMPLANTABLE DEVICE | Site: RIGHT | Status: FUNCTIONAL

## 2022-01-20 RX ORDER — POTASSIUM CHLORIDE 20 MEQ
1 PACKET (EA) ORAL
Qty: 0 | Refills: 0 | DISCHARGE

## 2022-01-20 RX ORDER — ASPIRIN/CALCIUM CARB/MAGNESIUM 324 MG
1 TABLET ORAL
Qty: 0 | Refills: 0 | DISCHARGE

## 2022-01-20 RX ORDER — INSULIN DEGLUDEC 100 U/ML
20 INJECTION, SOLUTION SUBCUTANEOUS
Qty: 0 | Refills: 0 | DISCHARGE

## 2022-01-20 RX ORDER — IRBESARTAN AND HYDROCHLOROTHIAZIDE 12.5; 3 MG/1; MG/1
1 TABLET ORAL
Qty: 0 | Refills: 0 | DISCHARGE

## 2022-01-20 RX ORDER — OXYCODONE HYDROCHLORIDE 5 MG/1
1 TABLET ORAL
Qty: 15 | Refills: 0
Start: 2022-01-20

## 2022-01-20 RX ORDER — SITAGLIPTIN AND METFORMIN HYDROCHLORIDE 500; 50 MG/1; MG/1
1 TABLET, FILM COATED ORAL
Qty: 0 | Refills: 0 | DISCHARGE

## 2022-01-20 RX ORDER — ERGOCALCIFEROL 1.25 MG/1
1 CAPSULE ORAL
Qty: 0 | Refills: 0 | DISCHARGE

## 2022-01-20 RX ORDER — ATORVASTATIN CALCIUM 80 MG/1
1 TABLET, FILM COATED ORAL
Qty: 0 | Refills: 0 | DISCHARGE

## 2022-01-20 RX ORDER — EZETIMIBE 10 MG/1
1 TABLET ORAL
Qty: 0 | Refills: 0 | DISCHARGE

## 2022-01-20 NOTE — ASU DISCHARGE PLAN (ADULT/PEDIATRIC) - ASU DC SPECIAL INSTRUCTIONSFT
REST! Apply ice packs to incision sites 20 mins on, 20 mins off every 4 hours for first 24 hours.     You may take plain Tylenol, ibuprofen (Advil, Motrin), or Aleve if you wish alternating with oxycodone prescription       If having trouble having a bowel movement:   - Metamucil or Konsyl (fiber supplement, available over the counter), 1 tablespoon in 8 oz. of water or juice twice a day  - Colace (stool softener, available over the counter), 100mg three times a day    Keep white steri strips on until follow up at office. Sometimes they fall off on own and thats okay. Okay to shower in 24 hours.     Please call [facility] office ([phone]) to schedule a follow-up appointment to be seen in 10-14 days.

## 2022-01-20 NOTE — ASU DISCHARGE PLAN (ADULT/PEDIATRIC) - NS MD DC FALL RISK RISK
For information on Fall & Injury Prevention, visit: [email or web address] OR  [email or web address] OR  https://www.cdc.gov/steadi/patient.html

## 2022-01-20 NOTE — ASU DISCHARGE PLAN (ADULT/PEDIATRIC) - NURSING INSTRUCTIONS
You received IV Tylenol for pain management at 12p. Please DO NOT take any Tylenol (Acetaminophen) containing products, such as Vicodin, Percocet, Excedrin, and cold medications for the next 6 hours (until 6PM). DO NOT TAKE MORE THAN 3000 MG OF TYLENOL in a 24 hour period    You received IV Toradol for pain management at 1230p. Please DO NOT take Motrin/Ibuprofen/Advil/Aleve/NSAIDs (Non-Steroidal Anti-Inflammatory Drugs) for the next 6 hours (until _630PM).

## 2022-01-20 NOTE — BRIEF OPERATIVE NOTE - NSICDXBRIEFPROCEDURE_GEN_ALL_CORE_FT
PROCEDURES:  Open repair of inguinal hernia using mesh in adult 20-Jan-2022 12:52:02  Gerri Sanchez

## 2022-01-20 NOTE — ASU DISCHARGE PLAN (ADULT/PEDIATRIC) - CARE PROVIDER_API CALL
Ike Rene)  ColonRectal Surgery; Surgery  [address], [address]  [place] [address]  Phone: [phone]  Fax: [phone]  Follow Up Time:

## 2022-03-09 NOTE — ED ADULT NURSE NOTE - LOCATION
Total Volume Injected (Ccs- Only Use Numbers And Decimals): 2 Consent: The risks of atrophy were reviewed with the patient. Concentration Of Solution Injected (Mg/Ml): 20.0 Medical Necessity Clause: This procedure was medically necessary because the lesions that were treated were: Administered By (Optional): Harlan Sullivan MD X Size Of Lesion In Cm (Optional): 0 Validate Note Data When Using Inventory: Yes Detail Level: Simple Kenalog Preparation: Kenalog Include Z78.9 (Other Specified Conditions Influencing Health Status) As An Associated Diagnosis?: No terra

## 2022-04-01 ENCOUNTER — APPOINTMENT (OUTPATIENT)
Dept: CARDIOLOGY | Facility: CLINIC | Age: 77
End: 2022-04-01
Payer: MEDICARE

## 2022-04-01 ENCOUNTER — NON-APPOINTMENT (OUTPATIENT)
Age: 77
End: 2022-04-01

## 2022-04-01 VITALS
WEIGHT: 204 LBS | RESPIRATION RATE: 16 BRPM | SYSTOLIC BLOOD PRESSURE: 126 MMHG | BODY MASS INDEX: 27.63 KG/M2 | HEIGHT: 72 IN | HEART RATE: 63 BPM | OXYGEN SATURATION: 99 % | TEMPERATURE: 98 F | DIASTOLIC BLOOD PRESSURE: 79 MMHG

## 2022-04-01 PROBLEM — E11.9 TYPE 2 DIABETES MELLITUS WITHOUT COMPLICATIONS: Chronic | Status: ACTIVE | Noted: 2022-01-03

## 2022-04-01 PROBLEM — K40.90 UNILATERAL INGUINAL HERNIA, WITHOUT OBSTRUCTION OR GANGRENE, NOT SPECIFIED AS RECURRENT: Chronic | Status: ACTIVE | Noted: 2022-01-12

## 2022-04-01 PROBLEM — I45.10 UNSPECIFIED RIGHT BUNDLE-BRANCH BLOCK: Chronic | Status: ACTIVE | Noted: 2022-01-12

## 2022-04-01 PROCEDURE — 99214 OFFICE O/P EST MOD 30 MIN: CPT

## 2022-04-01 NOTE — PHYSICAL EXAM
[Well Developed] : well developed [Well Nourished] : well nourished [No Acute Distress] : no acute distress [Normal Venous Pressure] : normal venous pressure [No Carotid Bruit] : no carotid bruit [Normal S1, S2] : normal S1, S2 [No Rub] : no rub [No Gallop] : no gallop [Clear Lung Fields] : clear lung fields [Good Air Entry] : good air entry [No Respiratory Distress] : no respiratory distress  [Soft] : abdomen soft [Non Tender] : non-tender [No Masses/organomegaly] : no masses/organomegaly [Normal Bowel Sounds] : normal bowel sounds [Normal Gait] : normal gait [No Edema] : no edema [No Cyanosis] : no cyanosis [No Clubbing] : no clubbing [No Varicosities] : no varicosities [No Rash] : no rash [No Skin Lesions] : no skin lesions [Moves all extremities] : moves all extremities [No Focal Deficits] : no focal deficits [Normal Speech] : normal speech [Alert and Oriented] : alert and oriented [Normal memory] : normal memory [General Appearance - Well Developed] : well developed [Normal Appearance] : normal appearance [Well Groomed] : well groomed [General Appearance - Well Nourished] : well nourished [Normal Conjunctiva] : the conjunctiva exhibited no abnormalities [Normal Oral Mucosa] : normal oral mucosa [Normal Jugular Venous V Waves Present] : normal jugular venous V waves present [FreeTextEntry1] : Left neck bruit v transmitted murmur [] : no respiratory distress [Respiration, Rhythm And Depth] : normal respiratory rhythm and effort [Exaggerated Use Of Accessory Muscles For Inspiration] : no accessory muscle use [Auscultation Breath Sounds / Voice Sounds] : lungs were clear to auscultation bilaterally [Abdomen Soft] : soft [Abnormal Walk] : normal gait [Gait - Sufficient For Exercise Testing] : the gait was sufficient for exercise testing [Nail Clubbing] : no clubbing of the fingernails [Skin Color & Pigmentation] : normal skin color and pigmentation [Skin Turgor] : normal skin turgor [Oriented To Time, Place, And Person] : oriented to person, place, and time [Impaired Insight] : insight and judgment were intact [Affect] : the affect was normal [Memory Recent] : recent memory was not impaired [5th Left ICS - MCL] : palpated at the 5th LICS in the midclavicular line [Normal] : normal [No Precordial Heave] : no precordial heave was noted [Normal Rate] : normal [Rhythm Regular] : regular [Normal S1] : normal S1 [Normal S2] : normal S2 [S3] : no S3 [S4] : no S4 [I] : a grade 1 [Right Carotid Bruit] : no bruit heard over the right carotid [Left Carotid Bruit] : no bruit heard over the left carotid [Right Femoral Bruit] : no bruit heard over the right femoral artery [Left Femoral Bruit] : no bruit heard over the left femoral artery [2+] : left 2+ [No Abnormalities] : the abdominal aorta was not enlarged and no bruit was heard [No Pitting Edema] : no pitting edema present

## 2022-04-01 NOTE — DISCUSSION/SUMMARY
[FreeTextEntry1] : This is a 77-year-old male with past medical history significant for murmur, hypertension, right bundle branch block, sleep apnea,NIDDM,  who comes in for cardiac evaluation. He denies chest pain, shortness of breath, dizziness or syncope.\par The patient is exercising on a daily basis.  He ran a few miles earlier today.\par He will have blood work done with his primary care physician this upcoming week.\par He is interested in his aortic root because a close friend  from an aortic dissection.  I recommended he speak to his primary care physician regarding this issue.  In the meantime he can have a chest x-ray.\par \par The patient has lost 20 pounds over the last 6 to 8 months.  He is walking on a regular basis.\par Ankle-brachial index done 2021 was nondiagnostic on the right secondary to noncompressible on the right with normal arterial flow on the left.\par \par Electrocardiogram done 2021 demonstrate normal sinus rhythm rate 68 bpm is otherwise remarkable for right bundle branch block.\par \par Echo Doppler examination from 2020, which demonstrated minimal to mild mitral valve regurgitation, minimal to mild tricuspid valve regurgitation, enlarged proximal aortic root of 4.3 cm, and aortic valve sclerosis.\par \par Electrocardiogram done 2020 demonstrated normal sinus rhythm rate 68 bpm is otherwise remarkable for right bundle branch block.\par \par Blood work May 19, 2020 which demonstrated hemoglobin A1c of 6.2, cholesterol 120, triglycerides 151, HDL of 27, calculated LDL cholesterol 63 mg/dL.\par He will continue on his current dose of Lipitor 20 mg/day, Avalide 300/12.5 mg/day, potassium 10 mEq only on Saturday and , aspirin 81 mg/day in addition to his diabetic medications.\par The patient is currently hemodynamically stable and asymptomatic from cardiac standpoint.  He will continue on his regular diet and exercise program.\par \par He had a normal thallium stress test 2016 with normal left ventricular function and estimated ejection fraction 65%.\par \par The patient understands that aerobic exercises must be increased to 40 minutes 4 times per week. A detailed discussion of lifestyle modification was done today. The patient has a good understanding of the diagnosis, and treatment plan. Lifestyle modification was also outlined.

## 2022-04-01 NOTE — REASON FOR VISIT
[CV Risk Factors and Non-Cardiac Disease] : CV risk factors and non-cardiac disease [Arrhythmia/ECG Abnorrmalities] : arrhythmia/ECG abnormalities [Follow-Up - Clinic] : a clinic follow-up of [Hyperlipidemia] : hyperlipidemia [Hypertension] : hypertension [Mitral Regurgitation] : mitral regurgitation [FreeTextEntry1] : NIDDM, sleep apnea

## 2022-04-04 ENCOUNTER — OUTPATIENT (OUTPATIENT)
Dept: OUTPATIENT SERVICES | Facility: HOSPITAL | Age: 77
LOS: 1 days | End: 2022-04-04
Payer: MEDICARE

## 2022-04-04 ENCOUNTER — RESULT REVIEW (OUTPATIENT)
Age: 77
End: 2022-04-04

## 2022-04-04 ENCOUNTER — APPOINTMENT (OUTPATIENT)
Dept: RADIOLOGY | Facility: CLINIC | Age: 77
End: 2022-04-04
Payer: MEDICARE

## 2022-04-04 DIAGNOSIS — Z00.8 ENCOUNTER FOR OTHER GENERAL EXAMINATION: ICD-10-CM

## 2022-04-04 DIAGNOSIS — Z90.89 ACQUIRED ABSENCE OF OTHER ORGANS: Chronic | ICD-10-CM

## 2022-04-04 DIAGNOSIS — Z98.890 OTHER SPECIFIED POSTPROCEDURAL STATES: Chronic | ICD-10-CM

## 2022-04-04 PROCEDURE — 71046 X-RAY EXAM CHEST 2 VIEWS: CPT | Mod: 26

## 2022-04-04 PROCEDURE — 71046 X-RAY EXAM CHEST 2 VIEWS: CPT

## 2022-04-04 RX ORDER — INSULIN GLARGINE 300 U/ML
300 INJECTION, SOLUTION SUBCUTANEOUS
Refills: 0 | Status: DISCONTINUED | COMMUNITY
End: 2022-04-04

## 2022-04-04 RX ORDER — GLIMEPIRIDE 2 MG/1
2 TABLET ORAL TWICE DAILY
Refills: 0 | Status: DISCONTINUED | COMMUNITY
End: 2022-04-04

## 2022-10-17 ENCOUNTER — APPOINTMENT (OUTPATIENT)
Dept: CARDIOLOGY | Facility: CLINIC | Age: 77
End: 2022-10-17

## 2022-11-21 ENCOUNTER — APPOINTMENT (OUTPATIENT)
Dept: CARDIOLOGY | Facility: CLINIC | Age: 77
End: 2022-11-21

## 2023-01-18 ENCOUNTER — NON-APPOINTMENT (OUTPATIENT)
Age: 78
End: 2023-01-18

## 2023-01-18 ENCOUNTER — APPOINTMENT (OUTPATIENT)
Dept: CARDIOLOGY | Facility: CLINIC | Age: 78
End: 2023-01-18
Payer: MEDICARE

## 2023-01-18 VITALS
WEIGHT: 200 LBS | BODY MASS INDEX: 27.09 KG/M2 | OXYGEN SATURATION: 99 % | DIASTOLIC BLOOD PRESSURE: 78 MMHG | TEMPERATURE: 98.4 F | HEIGHT: 72 IN | SYSTOLIC BLOOD PRESSURE: 118 MMHG | HEART RATE: 62 BPM | RESPIRATION RATE: 16 BRPM

## 2023-01-18 VITALS — HEART RATE: 62 BPM

## 2023-01-18 PROCEDURE — 93000 ELECTROCARDIOGRAM COMPLETE: CPT

## 2023-01-18 PROCEDURE — 99214 OFFICE O/P EST MOD 30 MIN: CPT

## 2023-01-18 NOTE — DISCUSSION/SUMMARY
[FreeTextEntry1] : This is a 77-year-old male with past medical history significant for murmur, hypertension, right bundle branch block, sleep apnea,NIDDM,  who comes in for cardiac evaluation. He denies chest pain, shortness of breath, dizziness or syncope.\par Electrocardiogram done 2023 demonstrated normal sinus rhythm rate of 62 bpm is otherwise remarkable for right bundle branch block.\par Patient will have an echo Doppler examination scheduled to evaluate his aortic root, left ventricular function, chamber size, and rule out hypertrophy.\par He will follow-up with his primary care physician and endocrinology.\par The patient is exercising on a daily basis.  He ran a few miles earlier today.\par He will have blood work done with his primary care physician this upcoming week.\par He is interested in his aortic root because a close friend  from an aortic dissection.  I recommended he speak to his primary care physician regarding this issue.  In the meantime he can have a chest x-ray.\par \par The patient has lost 20 pounds over the last 6 to 8 months.  He is walking on a regular basis.\par Ankle-brachial index done 2021 was nondiagnostic on the right secondary to noncompressible on the right with normal arterial flow on the left.\par \par Electrocardiogram done 2021 demonstrate normal sinus rhythm rate 68 bpm is otherwise remarkable for right bundle branch block.\par \par Echo Doppler examination from 2020, which demonstrated minimal to mild mitral valve regurgitation, minimal to mild tricuspid valve regurgitation, enlarged proximal aortic root of 4.3 cm, and aortic valve sclerosis.\par \par Electrocardiogram done 2020 demonstrated normal sinus rhythm rate 68 bpm is otherwise remarkable for right bundle branch block.\par \par Blood work May 19, 2020 which demonstrated hemoglobin A1c of 6.2, cholesterol 120, triglycerides 151, HDL of 27, calculated LDL cholesterol 63 mg/dL.\par He will continue on his current dose of Lipitor 20 mg/day, Avalide 300/12.5 mg/day, potassium 10 mEq only on Saturday and , aspirin 81 mg/day in addition to his diabetic medications.\par The patient is currently hemodynamically stable and asymptomatic from cardiac standpoint.  He will continue on his regular diet and exercise program.\par \par He had a normal thallium stress test 2016 with normal left ventricular function and estimated ejection fraction 65%.\par \par The patient understands that aerobic exercises must be increased to 40 minutes 4 times per week. A detailed discussion of lifestyle modification was done today. The patient has a good understanding of the diagnosis, and treatment plan. Lifestyle modification was also outlined.

## 2023-01-18 NOTE — PHYSICAL EXAM
[Well Developed] : well developed [Well Nourished] : well nourished [No Acute Distress] : no acute distress [Normal Venous Pressure] : normal venous pressure [No Carotid Bruit] : no carotid bruit [Normal S1, S2] : normal S1, S2 [No Rub] : no rub [No Gallop] : no gallop [Clear Lung Fields] : clear lung fields [Good Air Entry] : good air entry [No Respiratory Distress] : no respiratory distress  [Soft] : abdomen soft [Non Tender] : non-tender [No Masses/organomegaly] : no masses/organomegaly [Normal Bowel Sounds] : normal bowel sounds [Normal Gait] : normal gait [No Edema] : no edema [No Cyanosis] : no cyanosis [No Clubbing] : no clubbing [No Varicosities] : no varicosities [No Rash] : no rash [No Skin Lesions] : no skin lesions [Moves all extremities] : moves all extremities [No Focal Deficits] : no focal deficits [Normal Speech] : normal speech [Alert and Oriented] : alert and oriented [Normal memory] : normal memory [General Appearance - Well Developed] : well developed [Normal Appearance] : normal appearance [Well Groomed] : well groomed [General Appearance - Well Nourished] : well nourished [Normal Conjunctiva] : the conjunctiva exhibited no abnormalities [Normal Oral Mucosa] : normal oral mucosa [Normal Jugular Venous V Waves Present] : normal jugular venous V waves present [] : no respiratory distress [Respiration, Rhythm And Depth] : normal respiratory rhythm and effort [Exaggerated Use Of Accessory Muscles For Inspiration] : no accessory muscle use [Auscultation Breath Sounds / Voice Sounds] : lungs were clear to auscultation bilaterally [Abdomen Soft] : soft [Abnormal Walk] : normal gait [Gait - Sufficient For Exercise Testing] : the gait was sufficient for exercise testing [Nail Clubbing] : no clubbing of the fingernails [Skin Color & Pigmentation] : normal skin color and pigmentation [Skin Turgor] : normal skin turgor [Oriented To Time, Place, And Person] : oriented to person, place, and time [Impaired Insight] : insight and judgment were intact [Affect] : the affect was normal [Memory Recent] : recent memory was not impaired [5th Left ICS - MCL] : palpated at the 5th LICS in the midclavicular line [Normal] : normal [No Precordial Heave] : no precordial heave was noted [Normal Rate] : normal [Rhythm Regular] : regular [Normal S1] : normal S1 [Normal S2] : normal S2 [I] : a grade 1 [2+] : left 2+ [No Abnormalities] : the abdominal aorta was not enlarged and no bruit was heard [No Pitting Edema] : no pitting edema present [FreeTextEntry1] : Left neck bruit v transmitted murmur [S3] : no S3 [S4] : no S4 [Right Carotid Bruit] : no bruit heard over the right carotid [Left Carotid Bruit] : no bruit heard over the left carotid [Right Femoral Bruit] : no bruit heard over the right femoral artery [Left Femoral Bruit] : no bruit heard over the left femoral artery

## 2023-05-23 ENCOUNTER — APPOINTMENT (OUTPATIENT)
Dept: CARDIOLOGY | Facility: CLINIC | Age: 78
End: 2023-05-23
Payer: MEDICARE

## 2023-05-23 VITALS
HEART RATE: 97 BPM | RESPIRATION RATE: 16 BRPM | DIASTOLIC BLOOD PRESSURE: 80 MMHG | OXYGEN SATURATION: 97 % | WEIGHT: 203 LBS | TEMPERATURE: 97.1 F | HEIGHT: 72 IN | SYSTOLIC BLOOD PRESSURE: 125 MMHG | BODY MASS INDEX: 27.5 KG/M2

## 2023-05-23 DIAGNOSIS — I65.29 OCCLUSION AND STENOSIS OF UNSPECIFIED CAROTID ARTERY: ICD-10-CM

## 2023-05-23 DIAGNOSIS — I35.9 NONRHEUMATIC AORTIC VALVE DISORDER, UNSPECIFIED: ICD-10-CM

## 2023-05-23 DIAGNOSIS — R06.09 OTHER FORMS OF DYSPNEA: ICD-10-CM

## 2023-05-23 PROCEDURE — 99213 OFFICE O/P EST LOW 20 MIN: CPT | Mod: 25

## 2023-05-23 PROCEDURE — 93015 CV STRESS TEST SUPVJ I&R: CPT

## 2023-05-23 PROCEDURE — 93306 TTE W/DOPPLER COMPLETE: CPT

## 2023-05-23 PROCEDURE — 93880 EXTRACRANIAL BILAT STUDY: CPT

## 2023-05-23 NOTE — DISCUSSION/SUMMARY
[FreeTextEntry1] : This is a 78-year-old male with past medical history significant for murmur, hypertension, right bundle branch block, sleep apnea,NIDDM,  who comes in for cardiac evaluation. He denies chest pain, shortness of breath, dizziness or syncope.\par The patient had a normal exercise stress test May 23, 2023.\par He had recent blood work done with his endocrinologist, and he will get a copy for me to review.\par Lipid panel done 2022 demonstrated a cholesterol of 99 mg/dL, HDL 30 mg/dL, LDL 47 mg/dL.  He will remain on his usual medication, atorvastatin 40 mg/day and Zetia 10 mg/day.\par He is encouraged to continue his daily walking routine.\par He will follow-up with you regarding his overall medical care\par Electrocardiogram done 2023 demonstrated normal sinus rhythm rate of 62 bpm is otherwise remarkable for right bundle branch block.\par Patient will have an echo Doppler examination scheduled to evaluate his aortic root, left ventricular function, chamber size, and rule out hypertrophy.\par He will follow-up with his primary care physician and endocrinology.\par The patient is exercising on a daily basis.  He ran a few miles earlier today.\par He will have blood work done with his primary care physician this upcoming week.\par He is interested in his aortic root because a close friend  from an aortic dissection.  I recommended he speak to his primary care physician regarding this issue.  In the meantime he can have a chest x-ray.\par \par The patient has lost 20 pounds over the last 6 to 8 months.  He is walking on a regular basis.\par Ankle-brachial index done 2021 was nondiagnostic on the right secondary to noncompressible on the right with normal arterial flow on the left.\par \par Electrocardiogram done 2021 demonstrate normal sinus rhythm rate 68 bpm is otherwise remarkable for right bundle branch block.\par \par Echo Doppler examination from 2020, which demonstrated minimal to mild mitral valve regurgitation, minimal to mild tricuspid valve regurgitation, enlarged proximal aortic root of 4.3 cm, and aortic valve sclerosis.\par \par Electrocardiogram done 2020 demonstrated normal sinus rhythm rate 68 bpm is otherwise remarkable for right bundle branch block.\par \par Blood work May 19, 2020 which demonstrated hemoglobin A1c of 6.2, cholesterol 120, triglycerides 151, HDL of 27, calculated LDL cholesterol 63 mg/dL.\par He will continue on his current dose of Lipitor 20 mg/day, Avalide 300/12.5 mg/day, potassium 10 mEq only on Saturday and , aspirin 81 mg/day in addition to his diabetic medications.\par The patient is currently hemodynamically stable and asymptomatic from cardiac standpoint.  He will continue on his regular diet and exercise program.\par \par He had a normal thallium stress test 2016 with normal left ventricular function and estimated ejection fraction 65%.\par \par The patient understands that aerobic exercises must be increased to 40 minutes 4 times per week. A detailed discussion of lifestyle modification was done today. The patient has a good understanding of the diagnosis, and treatment plan. Lifestyle modification was also outlined.

## 2023-05-24 RX ORDER — FLUOROURACIL 50 MG/G
5 CREAM TOPICAL
Qty: 40 | Refills: 0 | Status: COMPLETED | COMMUNITY
Start: 2021-01-27 | End: 2023-05-24

## 2023-07-04 PROBLEM — R06.09 DYSPNEA ON EXERTION: Status: ACTIVE | Noted: 2023-05-23

## 2023-07-04 PROBLEM — I65.29 CAROTID ARTERY PLAQUE: Status: ACTIVE | Noted: 2019-07-26

## 2023-07-18 RX ORDER — EZETIMIBE 10 MG/1
10 TABLET ORAL
Qty: 90 | Refills: 1 | Status: ACTIVE | COMMUNITY
Start: 2021-12-01

## 2023-07-18 RX ORDER — ATORVASTATIN CALCIUM 40 MG/1
40 TABLET, FILM COATED ORAL
Qty: 90 | Refills: 1 | Status: ACTIVE | COMMUNITY

## 2023-07-18 RX ORDER — IRBESARTAN AND HYDROCHLOROTHIAZIDE 300; 12.5 MG/1; MG/1
300-12.5 TABLET ORAL
Qty: 90 | Refills: 1 | Status: ACTIVE | COMMUNITY

## 2023-09-21 NOTE — H&P PST ADULT - [facility]
Physical Therapy Visit    Visit Type: Daily Treatment Note  Visit: 2  Referring Provider: Lavon Douglas MD  Medical Diagnosis (from order): Diagnosis Information    Diagnosis  G57.92 (ICD-10-CM) - Neuropathy of left lower extremity  M54.50, M79.605 (ICD-10-CM) - Lumbar pain with radiation down left leg         SUBJECTIVE                                                                                                               Pt reports that he does not feel like his back is significantly better. Pt continues to have B hand numbness at night time which limits his sleep.       OBJECTIVE                                                                                                                     Range of Motion (ROM)   (degrees unless noted; active unless noted; norms in ( ); negative=lacking to 0, positive=beyond 0)  Cervical:    - Flexion (45-50): 35°    - Extension (45-60): 35°    - Rotation (60-80):        • Left: 70°        • Right: 70°                       Treatment     Therapeutic Exercise  Sideglide (no better)  L sidelying R rotation (decrease, better) 4min x 3* with towel  Chin tuck extension (decrease, better) 10 x 2*    Skilled input: verbal instruction/cues    Writer verbally educated and received verbal consent for hand placement, positioning of patient, and techniques to be performed today from patient   Home Exercise Program  *above indicates provided as part of home exercise program      ASSESSMENT                                                                                                            Pt's lumbar flexion deviation towards R side improved with flexion rotation exercise which seems to indicate that he has lateral derangement. Pt's cervical extension ROM improved significantly with repeated retraction extension this date.   Education:   - Results of above outlined education: Verbalizes understanding    PLAN                                                                                                                            Suggestions for next session as indicated: Progress per plan of care       Therapy procedure time and total treatment time can be found documented on the Time Entry flowsheet     3

## 2023-09-26 ENCOUNTER — APPOINTMENT (OUTPATIENT)
Dept: CARDIOLOGY | Facility: CLINIC | Age: 78
End: 2023-09-26

## 2024-01-24 ENCOUNTER — LABORATORY RESULT (OUTPATIENT)
Age: 79
End: 2024-01-24

## 2024-02-02 ENCOUNTER — RX RENEWAL (OUTPATIENT)
Age: 79
End: 2024-02-02

## 2024-02-23 LAB
ALBUMIN SERPL ELPH-MCNC: 4.5 G/DL
ALP BLD-CCNC: 74 U/L
ALT SERPL-CCNC: 27 U/L
AST SERPL-CCNC: 25 U/L
BILIRUB DIRECT SERPL-MCNC: 0.3 MG/DL
BILIRUB INDIRECT SERPL-MCNC: 1.6 MG/DL
BILIRUB SERPL-MCNC: 1.9 MG/DL
LDLC SERPL DIRECT ASSAY-MCNC: 57 MG/DL
PROT SERPL-MCNC: 7.1 G/DL

## 2024-03-11 ENCOUNTER — TRANSCRIPTION ENCOUNTER (OUTPATIENT)
Age: 79
End: 2024-03-11

## 2024-03-11 ENCOUNTER — NON-APPOINTMENT (OUTPATIENT)
Age: 79
End: 2024-03-11

## 2024-03-13 ENCOUNTER — APPOINTMENT (OUTPATIENT)
Dept: ORTHOPEDIC SURGERY | Facility: CLINIC | Age: 79
End: 2024-03-13
Payer: MEDICARE

## 2024-03-13 VITALS — BODY MASS INDEX: 27.22 KG/M2 | HEIGHT: 72 IN | WEIGHT: 201 LBS

## 2024-03-13 DIAGNOSIS — M65.342 TRIGGER FINGER, LEFT RING FINGER: ICD-10-CM

## 2024-03-13 PROCEDURE — 20550 NJX 1 TENDON SHEATH/LIGAMENT: CPT | Mod: LT

## 2024-03-13 PROCEDURE — 99204 OFFICE O/P NEW MOD 45 MIN: CPT | Mod: 25

## 2024-04-11 ENCOUNTER — APPOINTMENT (OUTPATIENT)
Dept: CARDIOLOGY | Facility: CLINIC | Age: 79
End: 2024-04-11
Payer: MEDICARE

## 2024-04-11 VITALS
SYSTOLIC BLOOD PRESSURE: 125 MMHG | WEIGHT: 201 LBS | HEART RATE: 84 BPM | TEMPERATURE: 97.1 F | DIASTOLIC BLOOD PRESSURE: 78 MMHG | HEIGHT: 72 IN | RESPIRATION RATE: 16 BRPM | BODY MASS INDEX: 27.22 KG/M2

## 2024-04-11 VITALS
WEIGHT: 201 LBS | BODY MASS INDEX: 27.22 KG/M2 | HEIGHT: 72 IN | SYSTOLIC BLOOD PRESSURE: 125 MMHG | DIASTOLIC BLOOD PRESSURE: 78 MMHG | RESPIRATION RATE: 16 BRPM | HEART RATE: 78 BPM | OXYGEN SATURATION: 98 % | TEMPERATURE: 97.8 F

## 2024-04-11 DIAGNOSIS — E78.41 ELEVATED LIPOPROTEIN(A): ICD-10-CM

## 2024-04-11 DIAGNOSIS — R01.1 CARDIAC MURMUR, UNSPECIFIED: ICD-10-CM

## 2024-04-11 DIAGNOSIS — E78.5 HYPERLIPIDEMIA, UNSPECIFIED: ICD-10-CM

## 2024-04-11 DIAGNOSIS — I10 ESSENTIAL (PRIMARY) HYPERTENSION: ICD-10-CM

## 2024-04-11 DIAGNOSIS — E11.9 TYPE 2 DIABETES MELLITUS W/OUT COMPLICATIONS: ICD-10-CM

## 2024-04-11 DIAGNOSIS — I45.10 UNSPECIFIED RIGHT BUNDLE-BRANCH BLOCK: ICD-10-CM

## 2024-04-11 PROCEDURE — 99214 OFFICE O/P EST MOD 30 MIN: CPT

## 2024-04-11 PROCEDURE — G2211 COMPLEX E/M VISIT ADD ON: CPT

## 2024-04-11 RX ORDER — POTASSIUM CHLORIDE 750 MG/1
10 CAPSULE, EXTENDED RELEASE ORAL
Qty: 24 | Refills: 1 | Status: ACTIVE | COMMUNITY
Start: 2018-12-21 | End: 1900-01-01

## 2024-04-11 NOTE — DISCUSSION/SUMMARY
[FreeTextEntry1] : This is a 79-year-old male with past medical history significant for murmur, hypertension, right bundle branch block, sleep apnea,NIDDM,  who comes in for cardiac evaluation. He denies chest pain, shortness of breath, dizziness or syncope. The patient is having podiatry issues and will be seeing his doctor later today. Echo Doppler examination done May 23, 2023 demonstrated normal left ventricular ejection fraction of 63%, aortic valve sclerosis, minimal tricuspid valve regurgitation, minimal mitral valve regurgitation, mild aortic valve regurgitation, borderline left ventricular hypertrophy, mild aortic valve regurgitation. The patient had a normal exercise stress test May 23, 2023. He had recent blood work done with his endocrinologist, and he will get a copy for me to review. Lipid panel done 2022 demonstrated a cholesterol of 99 mg/dL, HDL 30 mg/dL, LDL 47 mg/dL.  He will remain on his usual medication, atorvastatin 40 mg/day and Zetia 10 mg/day. He is encouraged to continue his daily walking routine. He will follow-up with you regarding his overall medical care Electrocardiogram done 2023 demonstrated normal sinus rhythm rate of 62 bpm is otherwise remarkable for right bundle branch block. Patient will have an echo Doppler examination scheduled to evaluate his aortic root, left ventricular function, chamber size, and rule out hypertrophy. He will follow-up with his primary care physician and endocrinology. The patient is exercising on a daily basis.  He ran a few miles earlier today. He will have blood work done with his primary care physician this upcoming week. He is interested in his aortic root because a close friend  from an aortic dissection.  I recommended he speak to his primary care physician regarding this issue.  In the meantime he can have a chest x-ray.  Ankle-brachial index done 2021 was nondiagnostic on the right secondary to noncompressible on the right with normal arterial flow on the left.  Electrocardiogram done 2021 demonstrate normal sinus rhythm rate 68 bpm is otherwise remarkable for right bundle branch block.  Echo Doppler examination from 2020, which demonstrated minimal to mild mitral valve regurgitation, minimal to mild tricuspid valve regurgitation, enlarged proximal aortic root of 4.3 cm, and aortic valve sclerosis.  Electrocardiogram done 2020 demonstrated normal sinus rhythm rate 68 bpm is otherwise remarkable for right bundle branch block.  Blood work May 19, 2020 which demonstrated hemoglobin A1c of 6.2, cholesterol 120, triglycerides 151, HDL of 27, calculated LDL cholesterol 63 mg/dL. He will continue on his current dose of Lipitor 20 mg/day, Avalide 300/12.5 mg/day, potassium 10 mEq only on Saturday and , aspirin 81 mg/day in addition to his diabetic medications. The patient is currently hemodynamically stable and asymptomatic from cardiac standpoint.  He will continue on his regular diet and exercise program.  He had a normal thallium stress test 2016 with normal left ventricular function and estimated ejection fraction 65%.  The patient understands that aerobic exercises must be increased to 40 minutes 4 times per week. A detailed discussion of lifestyle modification was done today. The patient has a good understanding of the diagnosis, and treatment plan. Lifestyle modification was also outlined.

## 2024-04-11 NOTE — PHYSICAL EXAM
[Well Developed] : well developed [Well Nourished] : well nourished [No Acute Distress] : no acute distress [Normal Venous Pressure] : normal venous pressure [No Carotid Bruit] : no carotid bruit [Normal S1, S2] : normal S1, S2 [No Rub] : no rub [Clear Lung Fields] : clear lung fields [Good Air Entry] : good air entry [No Respiratory Distress] : no respiratory distress  [Soft] : abdomen soft [Non Tender] : non-tender [No Masses/organomegaly] : no masses/organomegaly [Normal Bowel Sounds] : normal bowel sounds [Normal Gait] : normal gait [No Edema] : no edema [No Cyanosis] : no cyanosis [No Clubbing] : no clubbing [No Varicosities] : no varicosities [No Rash] : no rash [No Skin Lesions] : no skin lesions [Moves all extremities] : moves all extremities [No Focal Deficits] : no focal deficits [Normal Speech] : normal speech [Alert and Oriented] : alert and oriented [Normal memory] : normal memory [General Appearance - Well Developed] : well developed [Normal Appearance] : normal appearance [Well Groomed] : well groomed [General Appearance - Well Nourished] : well nourished [Normal Conjunctiva] : the conjunctiva exhibited no abnormalities [Normal Oral Mucosa] : normal oral mucosa [Normal Jugular Venous V Waves Present] : normal jugular venous V waves present [] : no respiratory distress [Respiration, Rhythm And Depth] : normal respiratory rhythm and effort [Exaggerated Use Of Accessory Muscles For Inspiration] : no accessory muscle use [Auscultation Breath Sounds / Voice Sounds] : lungs were clear to auscultation bilaterally [Abdomen Soft] : soft [Abnormal Walk] : normal gait [Gait - Sufficient For Exercise Testing] : the gait was sufficient for exercise testing [Nail Clubbing] : no clubbing of the fingernails [Skin Color & Pigmentation] : normal skin color and pigmentation [Skin Turgor] : normal skin turgor [Oriented To Time, Place, And Person] : oriented to person, place, and time [Impaired Insight] : insight and judgment were intact [Affect] : the affect was normal [Memory Recent] : recent memory was not impaired [5th Left ICS - MCL] : palpated at the 5th LICS in the midclavicular line [Normal] : normal [Normal Rate] : normal [Rhythm Regular] : regular [Normal S1] : normal S1 [Normal S2] : normal S2 [I] : a grade 1 [No Abnormalities] : the abdominal aorta was not enlarged and no bruit was heard [No Pitting Edema] : no pitting edema present [No Precordial Heave] : no precordial heave was noted [No Gallop] : no gallop heard [II] : a grade 2 [2+] : left 2+ [Click] : no click [Distant] : the heart sounds were ~L not distant [Pericardial Rub] : no pericardial rub [Rt] : no varicose veins of the right leg [Lt] : no varicose veins of the left leg [FreeTextEntry1] : Left neck bruit v transmitted murmur [S3] : no S3 [S4] : no S4 [Right Carotid Bruit] : no bruit heard over the right carotid [Left Carotid Bruit] : no bruit heard over the left carotid [Right Femoral Bruit] : no bruit heard over the right femoral artery [Left Femoral Bruit] : no bruit heard over the left femoral artery

## 2024-08-05 ENCOUNTER — APPOINTMENT (OUTPATIENT)
Dept: ORTHOPEDIC SURGERY | Facility: CLINIC | Age: 79
End: 2024-08-05

## 2024-08-05 PROCEDURE — 99214 OFFICE O/P EST MOD 30 MIN: CPT | Mod: 25

## 2024-08-05 PROCEDURE — 20550 NJX 1 TENDON SHEATH/LIGAMENT: CPT | Mod: LT

## 2024-10-02 ENCOUNTER — RX RENEWAL (OUTPATIENT)
Age: 79
End: 2024-10-02

## 2024-10-11 NOTE — ED ADULT TRIAGE NOTE - CCCP TRG CHIEF CMPLNT
[de-identified] : 10/2/2024. Marked sinus bradycardia
[de-identified] : 10/2/2024. Marked sinus bradycardia
R groin pain swelling /nausea

## 2024-11-06 ENCOUNTER — NON-APPOINTMENT (OUTPATIENT)
Age: 79
End: 2024-11-06

## 2024-11-11 ENCOUNTER — APPOINTMENT (OUTPATIENT)
Dept: CARDIOLOGY | Facility: CLINIC | Age: 79
End: 2024-11-11

## 2024-12-10 ENCOUNTER — TRANSCRIPTION ENCOUNTER (OUTPATIENT)
Age: 79
End: 2024-12-10

## 2024-12-16 ENCOUNTER — APPOINTMENT (OUTPATIENT)
Dept: ORTHOPEDIC SURGERY | Facility: CLINIC | Age: 79
End: 2024-12-16
Payer: MEDICARE

## 2024-12-16 DIAGNOSIS — M65.342 TRIGGER FINGER, LEFT RING FINGER: ICD-10-CM

## 2024-12-16 PROCEDURE — 26055 INCISE FINGER TENDON SHEATH: CPT | Mod: F3

## 2024-12-25 ENCOUNTER — NON-APPOINTMENT (OUTPATIENT)
Age: 79
End: 2024-12-25

## 2024-12-30 ENCOUNTER — APPOINTMENT (OUTPATIENT)
Dept: ORTHOPEDIC SURGERY | Facility: CLINIC | Age: 79
End: 2024-12-30
Payer: MEDICARE

## 2024-12-30 DIAGNOSIS — T81.41XA INFECTION FOLLOWING A PROCEDURE,SUPERFICIAL INCISIONAL SURGI SITE,INITIAL ENC: ICD-10-CM

## 2024-12-30 DIAGNOSIS — M65.342 TRIGGER FINGER, LEFT RING FINGER: ICD-10-CM

## 2024-12-30 PROCEDURE — 99024 POSTOP FOLLOW-UP VISIT: CPT

## 2024-12-30 RX ORDER — CEPHALEXIN 500 MG/1
500 CAPSULE ORAL
Qty: 20 | Refills: 0 | Status: ACTIVE | COMMUNITY
Start: 2024-12-30 | End: 1900-01-01

## 2024-12-31 ENCOUNTER — RX RENEWAL (OUTPATIENT)
Age: 79
End: 2024-12-31

## 2025-01-03 ENCOUNTER — APPOINTMENT (OUTPATIENT)
Dept: ORTHOPEDIC SURGERY | Facility: CLINIC | Age: 80
End: 2025-01-03

## 2025-01-21 ENCOUNTER — NON-APPOINTMENT (OUTPATIENT)
Age: 80
End: 2025-01-21

## 2025-01-31 ENCOUNTER — NON-APPOINTMENT (OUTPATIENT)
Age: 80
End: 2025-01-31

## 2025-04-01 ENCOUNTER — APPOINTMENT (OUTPATIENT)
Dept: CARDIOLOGY | Facility: CLINIC | Age: 80
End: 2025-04-01
Payer: MEDICARE

## 2025-04-01 VITALS
WEIGHT: 195 LBS | OXYGEN SATURATION: 98 % | BODY MASS INDEX: 26.41 KG/M2 | HEART RATE: 67 BPM | HEIGHT: 72 IN | DIASTOLIC BLOOD PRESSURE: 80 MMHG | SYSTOLIC BLOOD PRESSURE: 126 MMHG | TEMPERATURE: 97.6 F | RESPIRATION RATE: 16 BRPM

## 2025-04-01 DIAGNOSIS — I35.1 NONRHEUMATIC AORTIC (VALVE) INSUFFICIENCY: ICD-10-CM

## 2025-04-01 DIAGNOSIS — E78.41 ELEVATED LIPOPROTEIN(A): ICD-10-CM

## 2025-04-01 DIAGNOSIS — I35.9 NONRHEUMATIC AORTIC VALVE DISORDER, UNSPECIFIED: ICD-10-CM

## 2025-04-01 DIAGNOSIS — I45.10 UNSPECIFIED RIGHT BUNDLE-BRANCH BLOCK: ICD-10-CM

## 2025-04-01 DIAGNOSIS — G47.33 OBSTRUCTIVE SLEEP APNEA (ADULT) (PEDIATRIC): ICD-10-CM

## 2025-04-01 DIAGNOSIS — E11.9 TYPE 2 DIABETES MELLITUS W/OUT COMPLICATIONS: ICD-10-CM

## 2025-04-01 DIAGNOSIS — I65.29 OCCLUSION AND STENOSIS OF UNSPECIFIED CAROTID ARTERY: ICD-10-CM

## 2025-04-01 DIAGNOSIS — E78.5 HYPERLIPIDEMIA, UNSPECIFIED: ICD-10-CM

## 2025-04-01 DIAGNOSIS — I10 ESSENTIAL (PRIMARY) HYPERTENSION: ICD-10-CM

## 2025-04-01 PROCEDURE — 93000 ELECTROCARDIOGRAM COMPLETE: CPT

## 2025-04-01 PROCEDURE — G2211 COMPLEX E/M VISIT ADD ON: CPT

## 2025-04-01 PROCEDURE — 99214 OFFICE O/P EST MOD 30 MIN: CPT

## 2025-04-08 ENCOUNTER — RX RENEWAL (OUTPATIENT)
Age: 80
End: 2025-04-08

## 2025-04-10 ENCOUNTER — NON-APPOINTMENT (OUTPATIENT)
Age: 80
End: 2025-04-10

## 2025-05-16 ENCOUNTER — NON-APPOINTMENT (OUTPATIENT)
Age: 80
End: 2025-05-16

## 2025-08-29 ENCOUNTER — APPOINTMENT (OUTPATIENT)
Dept: ORTHOPEDIC SURGERY | Facility: CLINIC | Age: 80
End: 2025-08-29
Payer: MEDICARE

## 2025-08-29 DIAGNOSIS — R22.31 LOCALIZED SWELLING, MASS AND LUMP, RIGHT UPPER LIMB: ICD-10-CM

## 2025-08-29 PROCEDURE — 99214 OFFICE O/P EST MOD 30 MIN: CPT

## (undated) DEVICE — PACK MINOR NO DRAPE

## (undated) DEVICE — DRAPE LAPAROTOMY TRANSVERSE

## (undated) DEVICE — SUT PROLENE 0 30" CT-1

## (undated) DEVICE — SUT VICRYL 3-0 27" SH UNDYED

## (undated) DEVICE — SOL IRR POUR NS 0.9% 500ML

## (undated) DEVICE — GLV 7.5 PROTEXIS (WHITE)

## (undated) DEVICE — SUT VICRYL 2-0 27" SH UNDYED

## (undated) DEVICE — SUT VICRYL 0 27" CT-1 UNDYED

## (undated) DEVICE — SUT VICRYL 2-0 18" TIES UNDYED

## (undated) DEVICE — ELCTR GROUNDING PAD ADULT COVIDIEN

## (undated) DEVICE — DRAIN PENROSE .5" X 18" LATEX

## (undated) DEVICE — POSITIONER STRAP ARMBOARD VELCRO TS-30

## (undated) DEVICE — VENODYNE/SCD SLEEVE CALF MEDIUM

## (undated) DEVICE — DRSG BENZOIN 0.6CC

## (undated) DEVICE — SPONGE DISSECTOR PEANUT

## (undated) DEVICE — GOWN XL

## (undated) DEVICE — SUT PROLENE 1 30" CT-1

## (undated) DEVICE — DRSG STERISTRIPS 0.5 X 4"

## (undated) DEVICE — GLV 7 PROTEXIS (WHITE)

## (undated) DEVICE — WARMING BLANKET FULL UNDERBODY

## (undated) DEVICE — DRAPE TOWEL BLUE 17" X 24"